# Patient Record
Sex: FEMALE | Race: OTHER | HISPANIC OR LATINO
[De-identification: names, ages, dates, MRNs, and addresses within clinical notes are randomized per-mention and may not be internally consistent; named-entity substitution may affect disease eponyms.]

---

## 2021-05-25 ENCOUNTER — APPOINTMENT (OUTPATIENT)
Dept: SURGERY | Facility: CLINIC | Age: 71
End: 2021-05-25
Payer: MEDICARE

## 2021-05-25 VITALS
BODY MASS INDEX: 33.49 KG/M2 | WEIGHT: 201 LBS | TEMPERATURE: 97 F | SYSTOLIC BLOOD PRESSURE: 128 MMHG | HEART RATE: 67 BPM | DIASTOLIC BLOOD PRESSURE: 81 MMHG | OXYGEN SATURATION: 96 % | HEIGHT: 65 IN

## 2021-05-25 PROCEDURE — 99204 OFFICE O/P NEW MOD 45 MIN: CPT

## 2021-05-25 NOTE — ADDENDUM
[FreeTextEntry1] : This note was written by Bree Oneil on 05/25/2021 acting as scribe for Dr. Mariscal

## 2021-05-25 NOTE — PHYSICAL EXAM
[Obese, well nourished, in no acute distress] : obese, well nourished, in no acute distress [Normal] : affect appropriate [de-identified] : normal respiration

## 2021-05-25 NOTE — END OF VISIT
[FreeTextEntry3] : All medical record entries made by the Scribe were at my, Dr. Mariscal's, discretion and personally dictated by me on 05/25/2021. I have reviewed the chart and agree that the record accurately reflects my personal performance of the history, physical exam, assessment and plan. I have also personally directed, reviewed and agreed to the chart.

## 2021-05-25 NOTE — HISTORY OF PRESENT ILLNESS
[de-identified] : Pt is a 69 y/o Niuean-speaking F who presents today for initial consult of hiatal hernia. She is doing generally well. She reports severe GERD symptoms, stating that she feels a lot of acid reflux in her throat to the point she sometimes feels that she's drowning. Reports symptoms are most severe at night. EGD 3/14/21 demonstrates medium-sized hiatal hernia, tortuous esophagus, erythematous mucosa in the stomach, chronic gastritis.

## 2021-05-25 NOTE — ASSESSMENT
[FreeTextEntry1] : Pt is a 71 y/o Sinhala-speaking F who presents today for initial consult of hiatal hernia and severe GERD symptoms. EGD 3/14/21 demonstrates medium-sized hiatal hernia, tortuous esophagus, erythematous mucosa in the stomach, chronic gastritis. I informed pt that I can perform a surgery to repair the hernia and alleviate GERD symptoms. I ordered UGI series for further investigation. The risks, benefits, and alternatives to the proposed procedure were discussed with the patient and all questions were answered to their satisfaction. Pt will do UGI series and will follow up after to discuss results. Will tentatively schedule patient for hiatal hernia repair.

## 2021-06-08 ENCOUNTER — OUTPATIENT (OUTPATIENT)
Dept: OUTPATIENT SERVICES | Facility: HOSPITAL | Age: 71
LOS: 1 days | End: 2021-06-08
Payer: COMMERCIAL

## 2021-06-08 ENCOUNTER — APPOINTMENT (OUTPATIENT)
Dept: SURGERY | Facility: CLINIC | Age: 71
End: 2021-06-08
Payer: MEDICARE

## 2021-06-08 VITALS
TEMPERATURE: 97.3 F | HEIGHT: 65 IN | WEIGHT: 201.38 LBS | BODY MASS INDEX: 33.55 KG/M2 | SYSTOLIC BLOOD PRESSURE: 144 MMHG | DIASTOLIC BLOOD PRESSURE: 81 MMHG | HEART RATE: 61 BPM | OXYGEN SATURATION: 95 %

## 2021-06-08 DIAGNOSIS — Z01.818 ENCOUNTER FOR OTHER PREPROCEDURAL EXAMINATION: ICD-10-CM

## 2021-06-08 LAB
A1C WITH ESTIMATED AVERAGE GLUCOSE RESULT: 6 % — HIGH (ref 4–5.6)
ALBUMIN SERPL ELPH-MCNC: 4.6 G/DL — SIGNIFICANT CHANGE UP (ref 3.3–5)
ALP SERPL-CCNC: 56 U/L — SIGNIFICANT CHANGE UP (ref 40–120)
ALT FLD-CCNC: 13 U/L — SIGNIFICANT CHANGE UP (ref 10–45)
ANION GAP SERPL CALC-SCNC: 14 MMOL/L — SIGNIFICANT CHANGE UP (ref 5–17)
APPEARANCE UR: CLEAR — SIGNIFICANT CHANGE UP
APTT BLD: 32.6 SEC — SIGNIFICANT CHANGE UP (ref 27.5–35.5)
AST SERPL-CCNC: 16 U/L — SIGNIFICANT CHANGE UP (ref 10–40)
BASOPHILS # BLD AUTO: 0.04 K/UL — SIGNIFICANT CHANGE UP (ref 0–0.2)
BASOPHILS NFR BLD AUTO: 0.5 % — SIGNIFICANT CHANGE UP (ref 0–2)
BILIRUB SERPL-MCNC: 0.3 MG/DL — SIGNIFICANT CHANGE UP (ref 0.2–1.2)
BILIRUB UR-MCNC: NEGATIVE — SIGNIFICANT CHANGE UP
BUN SERPL-MCNC: 21 MG/DL — SIGNIFICANT CHANGE UP (ref 7–23)
CALCIUM SERPL-MCNC: 9.7 MG/DL — SIGNIFICANT CHANGE UP (ref 8.4–10.5)
CHLORIDE SERPL-SCNC: 104 MMOL/L — SIGNIFICANT CHANGE UP (ref 96–108)
CO2 SERPL-SCNC: 26 MMOL/L — SIGNIFICANT CHANGE UP (ref 22–31)
COLOR SPEC: YELLOW — SIGNIFICANT CHANGE UP
CREAT SERPL-MCNC: 1.23 MG/DL — SIGNIFICANT CHANGE UP (ref 0.5–1.3)
DIFF PNL FLD: NEGATIVE — SIGNIFICANT CHANGE UP
EOSINOPHIL # BLD AUTO: 0.18 K/UL — SIGNIFICANT CHANGE UP (ref 0–0.5)
EOSINOPHIL NFR BLD AUTO: 2.3 % — SIGNIFICANT CHANGE UP (ref 0–6)
ESTIMATED AVERAGE GLUCOSE: 126 MG/DL — HIGH (ref 68–114)
GLUCOSE SERPL-MCNC: 99 MG/DL — SIGNIFICANT CHANGE UP (ref 70–99)
GLUCOSE UR QL: NEGATIVE — SIGNIFICANT CHANGE UP
HCG UR QL: NEGATIVE — SIGNIFICANT CHANGE UP
HCT VFR BLD CALC: 36.6 % — SIGNIFICANT CHANGE UP (ref 34.5–45)
HGB BLD-MCNC: 11.4 G/DL — LOW (ref 11.5–15.5)
IMM GRANULOCYTES NFR BLD AUTO: 0.3 % — SIGNIFICANT CHANGE UP (ref 0–1.5)
INR BLD: 0.97 — SIGNIFICANT CHANGE UP (ref 0.88–1.16)
KETONES UR-MCNC: NEGATIVE — SIGNIFICANT CHANGE UP
LEUKOCYTE ESTERASE UR-ACNC: NEGATIVE — SIGNIFICANT CHANGE UP
LYMPHOCYTES # BLD AUTO: 1.83 K/UL — SIGNIFICANT CHANGE UP (ref 1–3.3)
LYMPHOCYTES # BLD AUTO: 23.5 % — SIGNIFICANT CHANGE UP (ref 13–44)
MCHC RBC-ENTMCNC: 29.8 PG — SIGNIFICANT CHANGE UP (ref 27–34)
MCHC RBC-ENTMCNC: 31.1 GM/DL — LOW (ref 32–36)
MCV RBC AUTO: 95.8 FL — SIGNIFICANT CHANGE UP (ref 80–100)
MONOCYTES # BLD AUTO: 0.49 K/UL — SIGNIFICANT CHANGE UP (ref 0–0.9)
MONOCYTES NFR BLD AUTO: 6.3 % — SIGNIFICANT CHANGE UP (ref 2–14)
NEUTROPHILS # BLD AUTO: 5.23 K/UL — SIGNIFICANT CHANGE UP (ref 1.8–7.4)
NEUTROPHILS NFR BLD AUTO: 67.1 % — SIGNIFICANT CHANGE UP (ref 43–77)
NITRITE UR-MCNC: NEGATIVE — SIGNIFICANT CHANGE UP
NRBC # BLD: 0 /100 WBCS — SIGNIFICANT CHANGE UP (ref 0–0)
PH UR: 6.5 — SIGNIFICANT CHANGE UP (ref 5–8)
PLATELET # BLD AUTO: 238 K/UL — SIGNIFICANT CHANGE UP (ref 150–400)
POTASSIUM SERPL-MCNC: 4.6 MMOL/L — SIGNIFICANT CHANGE UP (ref 3.5–5.3)
POTASSIUM SERPL-SCNC: 4.6 MMOL/L — SIGNIFICANT CHANGE UP (ref 3.5–5.3)
PROT SERPL-MCNC: 7.1 G/DL — SIGNIFICANT CHANGE UP (ref 6–8.3)
PROT UR-MCNC: NEGATIVE MG/DL — SIGNIFICANT CHANGE UP
PROTHROM AB SERPL-ACNC: 11.7 SEC — SIGNIFICANT CHANGE UP (ref 10.6–13.6)
RBC # BLD: 3.82 M/UL — SIGNIFICANT CHANGE UP (ref 3.8–5.2)
RBC # FLD: 13.2 % — SIGNIFICANT CHANGE UP (ref 10.3–14.5)
SODIUM SERPL-SCNC: 144 MMOL/L — SIGNIFICANT CHANGE UP (ref 135–145)
SP GR SPEC: 1.01 — SIGNIFICANT CHANGE UP (ref 1–1.03)
UROBILINOGEN FLD QL: 0.2 E.U./DL — SIGNIFICANT CHANGE UP
WBC # BLD: 7.79 K/UL — SIGNIFICANT CHANGE UP (ref 3.8–10.5)
WBC # FLD AUTO: 7.79 K/UL — SIGNIFICANT CHANGE UP (ref 3.8–10.5)

## 2021-06-08 PROCEDURE — 83036 HEMOGLOBIN GLYCOSYLATED A1C: CPT

## 2021-06-08 PROCEDURE — 87086 URINE CULTURE/COLONY COUNT: CPT

## 2021-06-08 PROCEDURE — 93010 ELECTROCARDIOGRAM REPORT: CPT

## 2021-06-08 PROCEDURE — 93005 ELECTROCARDIOGRAM TRACING: CPT

## 2021-06-08 PROCEDURE — 85610 PROTHROMBIN TIME: CPT

## 2021-06-08 PROCEDURE — 85025 COMPLETE CBC W/AUTO DIFF WBC: CPT

## 2021-06-08 PROCEDURE — 81025 URINE PREGNANCY TEST: CPT

## 2021-06-08 PROCEDURE — 80053 COMPREHEN METABOLIC PANEL: CPT

## 2021-06-08 PROCEDURE — 81003 URINALYSIS AUTO W/O SCOPE: CPT

## 2021-06-08 PROCEDURE — 85730 THROMBOPLASTIN TIME PARTIAL: CPT

## 2021-06-08 PROCEDURE — 99212 OFFICE O/P EST SF 10 MIN: CPT

## 2021-06-08 NOTE — PHYSICAL EXAM
[Obese, well nourished, in no acute distress] : obese, well nourished, in no acute distress [Normal] : affect appropriate [Obese] : obese [de-identified] : normal respiration

## 2021-06-08 NOTE — ADDENDUM
[FreeTextEntry1] : This note was written by Bree Oneil on 06/08/2021 acting as scribe for NOE Rodríguez.

## 2021-06-08 NOTE — END OF VISIT
[FreeTextEntry3] : All medical record entries made by the Dilciaibe were at my, NOE Rodríguez, discretion and personally dictated by me on 06/08/2021 . I have reviewed the chart and agree that the record accurately reflects my personal performance of the history, physical exam, assessment and plan. I have also personally directed, reviewed and agreed to the chart.

## 2021-06-08 NOTE — ASSESSMENT
[FreeTextEntry1] : Pt is a 71 y/o Bulgarian-speaking F who presents today for follow up consult of symptomatic hiatal hernia. Hernia repair has been scheduled but waiting on UGI series results.

## 2021-06-08 NOTE — HISTORY OF PRESENT ILLNESS
[de-identified] : Pt is a 71 y/o South Korean-speaking F who presents today for follow up consult of symptomatic hiatal hernia. She has been scheduled for a hiatal hernia repair but we are waiting on results from her UGI series as she did not bring with her/not in system. Pt unsure of where she went for this test and will work with Kimberley to assist in locating. \par \par \par UGI xray obtained from [facility]. Possible compression of esophagus from thoracic aorta, to be further evaluated by cardio

## 2021-06-08 NOTE — PLAN
[FreeTextEntry1] : Kimberley to assist pt in locating UGI, will schedule TEB ater obtained to review\par Surgery for hiatal hernia 7/22

## 2021-06-09 LAB
CULTURE RESULTS: SIGNIFICANT CHANGE UP
SPECIMEN SOURCE: SIGNIFICANT CHANGE UP

## 2021-06-22 ENCOUNTER — APPOINTMENT (OUTPATIENT)
Dept: ULTRASOUND IMAGING | Facility: HOSPITAL | Age: 71
End: 2021-06-22
Payer: MEDICARE

## 2021-06-22 ENCOUNTER — OUTPATIENT (OUTPATIENT)
Dept: OUTPATIENT SERVICES | Facility: HOSPITAL | Age: 71
LOS: 1 days | End: 2021-06-22
Payer: MEDICARE

## 2021-06-22 ENCOUNTER — RESULT REVIEW (OUTPATIENT)
Age: 71
End: 2021-06-22

## 2021-06-22 ENCOUNTER — APPOINTMENT (OUTPATIENT)
Dept: RADIOLOGY | Facility: HOSPITAL | Age: 71
End: 2021-06-22

## 2021-06-22 PROCEDURE — 76700 US EXAM ABDOM COMPLETE: CPT

## 2021-06-22 PROCEDURE — 74240 X-RAY XM UPR GI TRC 1CNTRST: CPT | Mod: 26

## 2021-06-22 PROCEDURE — 76700 US EXAM ABDOM COMPLETE: CPT | Mod: 26

## 2021-06-22 PROCEDURE — 71046 X-RAY EXAM CHEST 2 VIEWS: CPT

## 2021-06-22 PROCEDURE — 71046 X-RAY EXAM CHEST 2 VIEWS: CPT | Mod: 26

## 2021-06-22 PROCEDURE — 74240 X-RAY XM UPR GI TRC 1CNTRST: CPT

## 2021-07-19 ENCOUNTER — LABORATORY RESULT (OUTPATIENT)
Age: 71
End: 2021-07-19

## 2021-07-21 VITALS
SYSTOLIC BLOOD PRESSURE: 126 MMHG | HEIGHT: 65 IN | WEIGHT: 197.53 LBS | TEMPERATURE: 98 F | OXYGEN SATURATION: 98 % | DIASTOLIC BLOOD PRESSURE: 79 MMHG | HEART RATE: 59 BPM | RESPIRATION RATE: 18 BRPM

## 2021-07-21 NOTE — ASU PATIENT PROFILE, ADULT - PATIENT REPRESENTATIVE NAME
Jorge 45 Transitions Initial Follow Up Call    Outreach made within 2 business days of discharge: Yes    Patient: López Turpin Patient : 1982   MRN: <[id number]>  Reason for Admission: There are no discharge diagnoses documented for the most recent discharge. Discharge Date: 21       Spoke with: Gilbert Márquez    Discharge department/facility: [place]     TCM Interactive Patient Contact:  Was patient able to fill all prescriptions: Yes  Was patient instructed to bring all medications to the follow-up visit: Yes  Is patient taking all medications as directed in the discharge summary?  Yes  Does patient understand their discharge instructions: Yes  Does patient have questions or concerns that need addressed prior to 7-14 day follow up office visit: no    Scheduled appointment with PCP within 7-14 days    Follow Up  Future Appointments   Date Time Provider Jonah Reaves   2021 11:00 AM Justina Bullock, APRN - CNP [facility]       Geam Ramirez Majo Cook Daughter

## 2021-07-21 NOTE — ASU PATIENT PROFILE, ADULT - PSH
H/O bilateral breast reduction surgery    H/O:   x3  History of surgery  abdominoplasty   H/O bilateral breast reduction surgery    H/O:   x3  History of right knee surgery  TKR 2019  History of surgery  abdominoplasty

## 2021-07-22 ENCOUNTER — INPATIENT (INPATIENT)
Facility: HOSPITAL | Age: 71
LOS: 1 days | Discharge: HOME | DRG: 328 | End: 2021-07-24
Attending: SURGERY | Admitting: SURGERY
Payer: MEDICARE

## 2021-07-22 ENCOUNTER — APPOINTMENT (OUTPATIENT)
Dept: SURGERY | Facility: HOSPITAL | Age: 71
End: 2021-07-22

## 2021-07-22 DIAGNOSIS — E78.5 HYPERLIPIDEMIA, UNSPECIFIED: ICD-10-CM

## 2021-07-22 DIAGNOSIS — Z98.890 OTHER SPECIFIED POSTPROCEDURAL STATES: Chronic | ICD-10-CM

## 2021-07-22 DIAGNOSIS — K21.9 GASTRO-ESOPHAGEAL REFLUX DISEASE WITHOUT ESOPHAGITIS: ICD-10-CM

## 2021-07-22 DIAGNOSIS — K44.9 DIAPHRAGMATIC HERNIA WITHOUT OBSTRUCTION OR GANGRENE: ICD-10-CM

## 2021-07-22 DIAGNOSIS — I10 ESSENTIAL (PRIMARY) HYPERTENSION: ICD-10-CM

## 2021-07-22 DIAGNOSIS — Z98.891 HISTORY OF UTERINE SCAR FROM PREVIOUS SURGERY: Chronic | ICD-10-CM

## 2021-07-22 LAB
BASE EXCESS BLDA CALC-SCNC: 0.5 MMOL/L — SIGNIFICANT CHANGE UP (ref -2–3)
BLD GP AB SCN SERPL QL: NEGATIVE — SIGNIFICANT CHANGE UP
CA-I BLDA-SCNC: 1.22 MMOL/L — SIGNIFICANT CHANGE UP (ref 1.15–1.33)
CO2 BLDA-SCNC: 27 MMOL/L — HIGH (ref 19–24)
COHGB MFR BLDA: 0.5 % — SIGNIFICANT CHANGE UP
GAS PNL BLDA: SIGNIFICANT CHANGE UP
GLUCOSE BLDC GLUCOMTR-MCNC: 154 MG/DL — HIGH (ref 70–99)
HCO3 BLDA-SCNC: 25 MMOL/L — SIGNIFICANT CHANGE UP (ref 21–28)
HCT VFR BLD CALC: 37.2 % — SIGNIFICANT CHANGE UP (ref 34.5–45)
HGB BLD-MCNC: 12.1 G/DL — SIGNIFICANT CHANGE UP (ref 11.5–15.5)
HGB BLDA-MCNC: 10.9 G/DL — LOW (ref 11.7–16.1)
MCHC RBC-ENTMCNC: 30.3 PG — SIGNIFICANT CHANGE UP (ref 27–34)
MCHC RBC-ENTMCNC: 32.5 GM/DL — SIGNIFICANT CHANGE UP (ref 32–36)
MCV RBC AUTO: 93 FL — SIGNIFICANT CHANGE UP (ref 80–100)
METHGB MFR BLDA: 1 % — SIGNIFICANT CHANGE UP
NRBC # BLD: 0 /100 WBCS — SIGNIFICANT CHANGE UP (ref 0–0)
OXYHGB MFR BLDA: 96.8 % — HIGH (ref 90–95)
PCO2 BLDA: 41 MMHG — HIGH (ref 32–35)
PH BLDA: 7.4 — SIGNIFICANT CHANGE UP (ref 7.35–7.45)
PLATELET # BLD AUTO: 245 K/UL — SIGNIFICANT CHANGE UP (ref 150–400)
PO2 BLDA: 199 MMHG — HIGH (ref 83–108)
POTASSIUM BLDA-SCNC: 3.6 MMOL/L — SIGNIFICANT CHANGE UP (ref 3.5–5.1)
RBC # BLD: 4 M/UL — SIGNIFICANT CHANGE UP (ref 3.8–5.2)
RBC # FLD: 13.2 % — SIGNIFICANT CHANGE UP (ref 10.3–14.5)
RH IG SCN BLD-IMP: POSITIVE — SIGNIFICANT CHANGE UP
SAO2 % BLDA: 98.3 % — HIGH (ref 94–98)
SODIUM BLDA-SCNC: 139 MMOL/L — SIGNIFICANT CHANGE UP (ref 136–145)
WBC # BLD: 10.18 K/UL — SIGNIFICANT CHANGE UP (ref 3.8–10.5)
WBC # FLD AUTO: 10.18 K/UL — SIGNIFICANT CHANGE UP (ref 3.8–10.5)

## 2021-07-22 PROCEDURE — S2900 ROBOTIC SURGICAL SYSTEM: CPT | Mod: NC

## 2021-07-22 PROCEDURE — 43280 LAPAROSCOPY FUNDOPLASTY: CPT | Mod: GC

## 2021-07-22 RX ORDER — ACETAMINOPHEN 500 MG
1000 TABLET ORAL ONCE
Refills: 0 | Status: COMPLETED | OUTPATIENT
Start: 2021-07-22 | End: 2021-07-22

## 2021-07-22 RX ORDER — PANTOPRAZOLE SODIUM 20 MG/1
40 TABLET, DELAYED RELEASE ORAL DAILY
Refills: 0 | Status: DISCONTINUED | OUTPATIENT
Start: 2021-07-22 | End: 2021-07-24

## 2021-07-22 RX ORDER — ACETAMINOPHEN 500 MG
1000 TABLET ORAL ONCE
Refills: 0 | Status: COMPLETED | OUTPATIENT
Start: 2021-07-22 | End: 2021-07-23

## 2021-07-22 RX ORDER — ONDANSETRON 8 MG/1
4 TABLET, FILM COATED ORAL EVERY 4 HOURS
Refills: 0 | Status: DISCONTINUED | OUTPATIENT
Start: 2021-07-22 | End: 2021-07-24

## 2021-07-22 RX ORDER — ACETAMINOPHEN 500 MG
1000 TABLET ORAL ONCE
Refills: 0 | Status: COMPLETED | OUTPATIENT
Start: 2021-07-23 | End: 2021-07-23

## 2021-07-22 RX ORDER — SCOPALAMINE 1 MG/3D
1 PATCH, EXTENDED RELEASE TRANSDERMAL ONCE
Refills: 0 | Status: COMPLETED | OUTPATIENT
Start: 2021-07-22 | End: 2021-07-22

## 2021-07-22 RX ORDER — SCOPALAMINE 1 MG/3D
1 PATCH, EXTENDED RELEASE TRANSDERMAL ONCE
Refills: 0 | Status: DISCONTINUED | OUTPATIENT
Start: 2021-07-22 | End: 2021-07-24

## 2021-07-22 RX ORDER — SODIUM CHLORIDE 9 MG/ML
1000 INJECTION, SOLUTION INTRAVENOUS
Refills: 0 | Status: DISCONTINUED | OUTPATIENT
Start: 2021-07-22 | End: 2021-07-24

## 2021-07-22 RX ORDER — HYDROMORPHONE HYDROCHLORIDE 2 MG/ML
0.25 INJECTION INTRAMUSCULAR; INTRAVENOUS; SUBCUTANEOUS ONCE
Refills: 0 | Status: DISCONTINUED | OUTPATIENT
Start: 2021-07-22 | End: 2021-07-22

## 2021-07-22 RX ORDER — BUPIVACAINE 13.3 MG/ML
20 INJECTION, SUSPENSION, LIPOSOMAL INFILTRATION ONCE
Refills: 0 | Status: DISCONTINUED | OUTPATIENT
Start: 2021-07-22 | End: 2021-07-22

## 2021-07-22 RX ORDER — ENOXAPARIN SODIUM 100 MG/ML
40 INJECTION SUBCUTANEOUS ONCE
Refills: 0 | Status: COMPLETED | OUTPATIENT
Start: 2021-07-22 | End: 2021-07-22

## 2021-07-22 RX ORDER — ACETAMINOPHEN 500 MG
650 TABLET ORAL EVERY 6 HOURS
Refills: 0 | Status: DISCONTINUED | OUTPATIENT
Start: 2021-07-23 | End: 2021-07-24

## 2021-07-22 RX ORDER — KETOROLAC TROMETHAMINE 30 MG/ML
15 SYRINGE (ML) INJECTION EVERY 6 HOURS
Refills: 0 | Status: DISCONTINUED | OUTPATIENT
Start: 2021-07-22 | End: 2021-07-24

## 2021-07-22 RX ORDER — GABAPENTIN 400 MG/1
300 CAPSULE ORAL ONCE
Refills: 0 | Status: COMPLETED | OUTPATIENT
Start: 2021-07-22 | End: 2021-07-22

## 2021-07-22 RX ADMIN — Medication 15 MILLIGRAM(S): at 23:11

## 2021-07-22 RX ADMIN — HYDROMORPHONE HYDROCHLORIDE 0.25 MILLIGRAM(S): 2 INJECTION INTRAMUSCULAR; INTRAVENOUS; SUBCUTANEOUS at 17:53

## 2021-07-22 RX ADMIN — ENOXAPARIN SODIUM 40 MILLIGRAM(S): 100 INJECTION SUBCUTANEOUS at 09:32

## 2021-07-22 RX ADMIN — SCOPALAMINE 1 PATCH: 1 PATCH, EXTENDED RELEASE TRANSDERMAL at 09:33

## 2021-07-22 RX ADMIN — Medication 1000 MILLIGRAM(S): at 09:31

## 2021-07-22 RX ADMIN — GABAPENTIN 300 MILLIGRAM(S): 400 CAPSULE ORAL at 09:33

## 2021-07-22 RX ADMIN — Medication 15 MILLIGRAM(S): at 23:26

## 2021-07-22 RX ADMIN — HYDROMORPHONE HYDROCHLORIDE 0.25 MILLIGRAM(S): 2 INJECTION INTRAMUSCULAR; INTRAVENOUS; SUBCUTANEOUS at 18:40

## 2021-07-22 RX ADMIN — Medication 400 MILLIGRAM(S): at 15:05

## 2021-07-22 RX ADMIN — PANTOPRAZOLE SODIUM 40 MILLIGRAM(S): 20 TABLET, DELAYED RELEASE ORAL at 15:06

## 2021-07-22 RX ADMIN — Medication 1000 MILLIGRAM(S): at 15:35

## 2021-07-22 NOTE — BRIEF OPERATIVE NOTE - NSICDXBRIEFPROCEDURE_GEN_ALL_CORE_FT
PROCEDURES:  Robot-assisted laparoscopic Nissen fundoplication for hiatal hernia 22-Jul-2021 14:22:03  Shawanda De Dios

## 2021-07-22 NOTE — BRIEF OPERATIVE NOTE - OPERATION/FINDINGS
Abimael needle insufflation, Abdomen inspected. Robot docked, and targeted. Contents of hernia sac reduced into abdomen, Vagus identified and protected, esophagus mobilized distally, closure of the crura with Quill suture x 1, creation of fundoplication and securing with polyester suture x 2.  The abdomen was inspected. Hemostasis achieved. Robot undocked. skin closed with Monocryl.    The abdomen was inspected. Hemostasis achieved. Robot undocked. skin closed with Monocryl.

## 2021-07-22 NOTE — H&P ADULT - ASSESSMENT
70 year old female with PMH of GERD non-responsive to medications HTN, HLD, and PMH of Tummy tuck procedure, 3 c- section, and bilateral breast reduction surgery, who presents for elective robotic-assisted Hiatal hernia repair    plan:   proceed with robotic-assisted Hiatal hernia repair

## 2021-07-22 NOTE — H&P ADULT - HISTORY OF PRESENT ILLNESS
70 year old female with PMH of GERD non-responsive to medications HTN, HLD,  and PMH of Tummy tuck procedure, 3 c- section, and bilateral breast reduction surgery, who presents for elective robotic -assisted Hiatal hernia repair. Doing well,. Patient reports history of GERD for > 8 months. has been taking PPI without improvement Recently had EGD 3 months ago with Dr. Wooten and found to have hiatal hernia and gastritis; biopsies were taken and were negative. She dnies dysphagia to solids or liquids, no fevers, chills, SOB, chest pain, N/V/D/C.     Pre-op Hg/Hct: 11.4/36.6   70 year old female with PMH of GERD non-responsive to medications HTN, HLD, and PSH of Tummy tuck procedure (abdominoplasty), 3 c- section, and bilateral breast reduction surgery, who presents for elective robotic -assisted Hiatal hernia repair. Doing well,. Patient reports history of GERD for > 8 months. has been taking PPI without improvement Recently had EGD 3 months ago with Dr. Wooten and found to have hiatal hernia and gastritis; biopsies were taken and were negative. She dnies dysphagia to solids or liquids, no fevers, chills, SOB, chest pain, N/V/D/C.     Pre-op Hg/Hct: 11.4/36.6

## 2021-07-22 NOTE — H&P ADULT - NSHPPHYSICALEXAM_GEN_ALL_CORE
GENERAL: NAD, Resting comfortably in bed  HEENT: NCAT, MMM, Normal conjunctiva, PERRL  RESP: Nonlabored breathing, No respiratory distress  CARD: Normal rate, Normal peripheral perfusion  GI: Soft, ND, NT, No guarding, No rebound tenderness, previous surgery scars   EXTREM: No gross deformity of extremities  SKIN: No rashes, no lesions  NEURO: AAOx3, No focal motor or sensory deficits  PSYCH: Affect and characteristics of appearance, verbalizations, and behaviors are appropriate

## 2021-07-22 NOTE — BRIEF OPERATIVE NOTE - NSICDXBRIEFPOSTOP_GEN_ALL_CORE_FT
POST-OP DIAGNOSIS:  Hiatal hernia 22-Jul-2021 14:23:30  Shawanda De Dios  GERD (gastroesophageal reflux disease) 22-Jul-2021 14:23:47  Shawanda De Dios

## 2021-07-22 NOTE — H&P ADULT - NSICDXPASTSURGICALHX_GEN_ALL_CORE_FT
PAST SURGICAL HISTORY:  H/O bilateral breast reduction surgery     H/O:  x3    History of right knee surgery TKR 2019    History of surgery abdominoplasty

## 2021-07-23 ENCOUNTER — TRANSCRIPTION ENCOUNTER (OUTPATIENT)
Age: 71
End: 2021-07-23

## 2021-07-23 LAB
ANION GAP SERPL CALC-SCNC: 9 MMOL/L — SIGNIFICANT CHANGE UP (ref 5–17)
BUN SERPL-MCNC: 17 MG/DL — SIGNIFICANT CHANGE UP (ref 7–23)
CALCIUM SERPL-MCNC: 9 MG/DL — SIGNIFICANT CHANGE UP (ref 8.4–10.5)
CHLORIDE SERPL-SCNC: 104 MMOL/L — SIGNIFICANT CHANGE UP (ref 96–108)
CO2 SERPL-SCNC: 26 MMOL/L — SIGNIFICANT CHANGE UP (ref 22–31)
COVID-19 SPIKE DOMAIN AB INTERP: POSITIVE
COVID-19 SPIKE DOMAIN ANTIBODY RESULT: >250 U/ML — HIGH
CREAT SERPL-MCNC: 1.22 MG/DL — SIGNIFICANT CHANGE UP (ref 0.5–1.3)
GLUCOSE SERPL-MCNC: 113 MG/DL — HIGH (ref 70–99)
HCT VFR BLD CALC: 33.6 % — LOW (ref 34.5–45)
HCV AB S/CO SERPL IA: 0.04 S/CO — SIGNIFICANT CHANGE UP
HCV AB SERPL-IMP: SIGNIFICANT CHANGE UP
HGB BLD-MCNC: 10.5 G/DL — LOW (ref 11.5–15.5)
MAGNESIUM SERPL-MCNC: 2 MG/DL — SIGNIFICANT CHANGE UP (ref 1.6–2.6)
MCHC RBC-ENTMCNC: 29.5 PG — SIGNIFICANT CHANGE UP (ref 27–34)
MCHC RBC-ENTMCNC: 31.3 GM/DL — LOW (ref 32–36)
MCV RBC AUTO: 94.4 FL — SIGNIFICANT CHANGE UP (ref 80–100)
NRBC # BLD: 0 /100 WBCS — SIGNIFICANT CHANGE UP (ref 0–0)
PHOSPHATE SERPL-MCNC: 3.6 MG/DL — SIGNIFICANT CHANGE UP (ref 2.5–4.5)
PLATELET # BLD AUTO: 211 K/UL — SIGNIFICANT CHANGE UP (ref 150–400)
POTASSIUM SERPL-MCNC: 4.4 MMOL/L — SIGNIFICANT CHANGE UP (ref 3.5–5.3)
POTASSIUM SERPL-SCNC: 4.4 MMOL/L — SIGNIFICANT CHANGE UP (ref 3.5–5.3)
RBC # BLD: 3.56 M/UL — LOW (ref 3.8–5.2)
RBC # FLD: 13.4 % — SIGNIFICANT CHANGE UP (ref 10.3–14.5)
SARS-COV-2 IGG+IGM SERPL QL IA: >250 U/ML — HIGH
SARS-COV-2 IGG+IGM SERPL QL IA: POSITIVE
SODIUM SERPL-SCNC: 139 MMOL/L — SIGNIFICANT CHANGE UP (ref 135–145)
WBC # BLD: 9.78 K/UL — SIGNIFICANT CHANGE UP (ref 3.8–10.5)
WBC # FLD AUTO: 9.78 K/UL — SIGNIFICANT CHANGE UP (ref 3.8–10.5)

## 2021-07-23 PROCEDURE — 74240 X-RAY XM UPR GI TRC 1CNTRST: CPT | Mod: 26

## 2021-07-23 RX ORDER — ENOXAPARIN SODIUM 100 MG/ML
40 INJECTION SUBCUTANEOUS EVERY 24 HOURS
Refills: 0 | Status: DISCONTINUED | OUTPATIENT
Start: 2021-07-23 | End: 2021-07-24

## 2021-07-23 RX ORDER — ENOXAPARIN SODIUM 100 MG/ML
40 INJECTION SUBCUTANEOUS ONCE
Refills: 0 | Status: DISCONTINUED | OUTPATIENT
Start: 2021-07-23 | End: 2021-07-23

## 2021-07-23 RX ORDER — METOPROLOL TARTRATE 50 MG
50 TABLET ORAL DAILY
Refills: 0 | Status: DISCONTINUED | OUTPATIENT
Start: 2021-07-23 | End: 2021-07-23

## 2021-07-23 RX ORDER — PANTOPRAZOLE SODIUM 20 MG/1
1 TABLET, DELAYED RELEASE ORAL
Qty: 30 | Refills: 0
Start: 2021-07-23 | End: 2021-08-21

## 2021-07-23 RX ORDER — DEXAMETHASONE 0.5 MG/5ML
6 ELIXIR ORAL ONCE
Refills: 0 | Status: COMPLETED | OUTPATIENT
Start: 2021-07-23 | End: 2021-07-23

## 2021-07-23 RX ORDER — METOPROLOL TARTRATE 50 MG
50 TABLET ORAL
Refills: 0 | Status: DISCONTINUED | OUTPATIENT
Start: 2021-07-23 | End: 2021-07-24

## 2021-07-23 RX ORDER — ATORVASTATIN CALCIUM 80 MG/1
20 TABLET, FILM COATED ORAL AT BEDTIME
Refills: 0 | Status: DISCONTINUED | OUTPATIENT
Start: 2021-07-23 | End: 2021-07-24

## 2021-07-23 RX ORDER — ACETAMINOPHEN 500 MG
2 TABLET ORAL
Qty: 40 | Refills: 0
Start: 2021-07-23 | End: 2021-07-27

## 2021-07-23 RX ADMIN — SCOPALAMINE 1 PATCH: 1 PATCH, EXTENDED RELEASE TRANSDERMAL at 18:40

## 2021-07-23 RX ADMIN — Medication 400 MILLIGRAM(S): at 21:51

## 2021-07-23 RX ADMIN — ENOXAPARIN SODIUM 40 MILLIGRAM(S): 100 INJECTION SUBCUTANEOUS at 12:53

## 2021-07-23 RX ADMIN — Medication 15 MILLIGRAM(S): at 12:54

## 2021-07-23 RX ADMIN — Medication 1000 MILLIGRAM(S): at 05:09

## 2021-07-23 RX ADMIN — Medication 50 MILLIGRAM(S): at 22:32

## 2021-07-23 RX ADMIN — ATORVASTATIN CALCIUM 20 MILLIGRAM(S): 80 TABLET, FILM COATED ORAL at 22:32

## 2021-07-23 RX ADMIN — Medication 15 MILLIGRAM(S): at 04:50

## 2021-07-23 RX ADMIN — Medication 15 MILLIGRAM(S): at 13:24

## 2021-07-23 RX ADMIN — Medication 15 MILLIGRAM(S): at 18:36

## 2021-07-23 RX ADMIN — SCOPALAMINE 1 PATCH: 1 PATCH, EXTENDED RELEASE TRANSDERMAL at 07:14

## 2021-07-23 RX ADMIN — Medication 15 MILLIGRAM(S): at 04:35

## 2021-07-23 RX ADMIN — Medication 1000 MILLIGRAM(S): at 22:21

## 2021-07-23 RX ADMIN — PANTOPRAZOLE SODIUM 40 MILLIGRAM(S): 20 TABLET, DELAYED RELEASE ORAL at 12:54

## 2021-07-23 RX ADMIN — Medication 6 MILLIGRAM(S): at 16:12

## 2021-07-23 RX ADMIN — Medication 15 MILLIGRAM(S): at 19:30

## 2021-07-23 RX ADMIN — Medication 400 MILLIGRAM(S): at 04:39

## 2021-07-23 NOTE — DISCHARGE NOTE PROVIDER - NSDCCPTREATMENT_GEN_ALL_CORE_FT
PRINCIPAL PROCEDURE  Procedure: Robot-assisted laparoscopic Nissen fundoplication for hiatal hernia  Findings and Treatment:

## 2021-07-23 NOTE — DISCHARGE NOTE PROVIDER - HOSPITAL COURSE
70 year old female with PMH of GERD non-responsive to medications HTN, HLD, and PSH of Tummy tuck procedure (abdominoplasty), 3 c- section, and bilateral breast reduction surgery, now s/p Robot-assisted laparoscopic Nissen fundoplication for hiatal hernia 7/22. Patient's post operative course was uncomplicated. Patient met post-operative milestones. Pain was well controlled. Patient was tolerating diet without nausea or vomiting. Patient was ambulating without difficulties. Patient was stable and deemed appropriate for discharge. She will follow-up in clinic.

## 2021-07-23 NOTE — DISCHARGE NOTE PROVIDER - NSDCFUADDINST_GEN_ALL_CORE_FT
Follow up with Dr. Mariscal in 1 week. Call the office at  to schedule your appointment.  You may shower; soap and water over incision sites. Do not scrub. Pat dry when done. No tub bathing or swimming until cleared. Keep incision sites out of the sun as scars will darken. No heavy lifting (>10lbs) or strenuous exercise. Diet: Bariatric Full Fluids. 60 grams protein daily.  64 fluid ounces water daily. Drink small sips throughout the day. Continue diet as outlined by paperwork received as a pre-operative patient. You should be urinating at least 3-4x per day. Call the office if you experience increasing abdominal pain, nausea, vomiting, or temperature >100.4F.  NO ASPIRIN OR NSAIDs until approved by Dr. Mariscal. Avoid alcoholic beverages until cleared by Dr. Mariscal.    1) Please take Tylenol 650 mg every 4 to 6 hours by mouth for moderate pain control. Please do not exceed over 4,000 mg of Tylenol a day.  2) Please take Pantoprazole 40 mg once a day by mouth.

## 2021-07-23 NOTE — DISCHARGE NOTE PROVIDER - CARE PROVIDER_API CALL
Uriah Mariscal)  Surgery  [address] [address]  Phone: [phone]  Fax: [phone]  Follow Up Time:

## 2021-07-23 NOTE — DISCHARGE NOTE PROVIDER - NSDCCPCAREPLAN_GEN_ALL_CORE_FT
PRINCIPAL DISCHARGE DIAGNOSIS  Diagnosis: Chronic GERD  Assessment and Plan of Treatment: unresponsive to medications and conservative treatment. Patient now s/p Nissen funcoplication with hiatal hernia repair.   1) Please take Tylenol 650 mg every 4 to 6 hours by mouth for moderate pain control. Please do not exceed over 4,000 mg of Tylenol a day.  2) Please take Pantoprazole 40 mg once a day by mouth.

## 2021-07-23 NOTE — DISCHARGE NOTE PROVIDER - NSDCMRMEDTOKEN_GEN_ALL_CORE_FT
Protonix 40 mg oral delayed release tablet: 1 tab(s) orally once a day   Tylenol 325 mg oral tablet: 2 tab(s) orally every 6 hours MDD:8 tablets

## 2021-07-24 ENCOUNTER — TRANSCRIPTION ENCOUNTER (OUTPATIENT)
Age: 71
End: 2021-07-24

## 2021-07-24 VITALS
RESPIRATION RATE: 20 BRPM | TEMPERATURE: 99 F | DIASTOLIC BLOOD PRESSURE: 72 MMHG | HEART RATE: 63 BPM | OXYGEN SATURATION: 95 % | SYSTOLIC BLOOD PRESSURE: 152 MMHG

## 2021-07-24 LAB
ANION GAP SERPL CALC-SCNC: 9 MMOL/L — SIGNIFICANT CHANGE UP (ref 5–17)
BUN SERPL-MCNC: 15 MG/DL — SIGNIFICANT CHANGE UP (ref 7–23)
CALCIUM SERPL-MCNC: 9.2 MG/DL — SIGNIFICANT CHANGE UP (ref 8.4–10.5)
CHLORIDE SERPL-SCNC: 108 MMOL/L — SIGNIFICANT CHANGE UP (ref 96–108)
CO2 SERPL-SCNC: 26 MMOL/L — SIGNIFICANT CHANGE UP (ref 22–31)
CREAT SERPL-MCNC: 1.08 MG/DL — SIGNIFICANT CHANGE UP (ref 0.5–1.3)
GLUCOSE SERPL-MCNC: 133 MG/DL — HIGH (ref 70–99)
HCT VFR BLD CALC: 32.2 % — LOW (ref 34.5–45)
HGB BLD-MCNC: 10.2 G/DL — LOW (ref 11.5–15.5)
MAGNESIUM SERPL-MCNC: 2.1 MG/DL — SIGNIFICANT CHANGE UP (ref 1.6–2.6)
MCHC RBC-ENTMCNC: 29.7 PG — SIGNIFICANT CHANGE UP (ref 27–34)
MCHC RBC-ENTMCNC: 31.7 GM/DL — LOW (ref 32–36)
MCV RBC AUTO: 93.9 FL — SIGNIFICANT CHANGE UP (ref 80–100)
NRBC # BLD: 0 /100 WBCS — SIGNIFICANT CHANGE UP (ref 0–0)
PHOSPHATE SERPL-MCNC: 3.7 MG/DL — SIGNIFICANT CHANGE UP (ref 2.5–4.5)
PLATELET # BLD AUTO: 212 K/UL — SIGNIFICANT CHANGE UP (ref 150–400)
POTASSIUM SERPL-MCNC: 4.3 MMOL/L — SIGNIFICANT CHANGE UP (ref 3.5–5.3)
POTASSIUM SERPL-SCNC: 4.3 MMOL/L — SIGNIFICANT CHANGE UP (ref 3.5–5.3)
RBC # BLD: 3.43 M/UL — LOW (ref 3.8–5.2)
RBC # FLD: 13.7 % — SIGNIFICANT CHANGE UP (ref 10.3–14.5)
SODIUM SERPL-SCNC: 143 MMOL/L — SIGNIFICANT CHANGE UP (ref 135–145)
WBC # BLD: 7.81 K/UL — SIGNIFICANT CHANGE UP (ref 3.8–10.5)
WBC # FLD AUTO: 7.81 K/UL — SIGNIFICANT CHANGE UP (ref 3.8–10.5)

## 2021-07-24 PROCEDURE — 86900 BLOOD TYPING SEROLOGIC ABO: CPT

## 2021-07-24 PROCEDURE — 85027 COMPLETE CBC AUTOMATED: CPT

## 2021-07-24 PROCEDURE — S2900: CPT

## 2021-07-24 PROCEDURE — 36415 COLL VENOUS BLD VENIPUNCTURE: CPT

## 2021-07-24 PROCEDURE — 86901 BLOOD TYPING SEROLOGIC RH(D): CPT

## 2021-07-24 PROCEDURE — 86803 HEPATITIS C AB TEST: CPT

## 2021-07-24 PROCEDURE — 84100 ASSAY OF PHOSPHORUS: CPT

## 2021-07-24 PROCEDURE — 74240 X-RAY XM UPR GI TRC 1CNTRST: CPT

## 2021-07-24 PROCEDURE — 86769 SARS-COV-2 COVID-19 ANTIBODY: CPT

## 2021-07-24 PROCEDURE — 85018 HEMOGLOBIN: CPT

## 2021-07-24 PROCEDURE — 82330 ASSAY OF CALCIUM: CPT

## 2021-07-24 PROCEDURE — 84132 ASSAY OF SERUM POTASSIUM: CPT

## 2021-07-24 PROCEDURE — 84295 ASSAY OF SERUM SODIUM: CPT

## 2021-07-24 PROCEDURE — 86850 RBC ANTIBODY SCREEN: CPT

## 2021-07-24 PROCEDURE — 83735 ASSAY OF MAGNESIUM: CPT

## 2021-07-24 PROCEDURE — 80048 BASIC METABOLIC PNL TOTAL CA: CPT

## 2021-07-24 PROCEDURE — 82962 GLUCOSE BLOOD TEST: CPT

## 2021-07-24 RX ORDER — PANTOPRAZOLE SODIUM 20 MG/1
1 TABLET, DELAYED RELEASE ORAL
Qty: 30 | Refills: 0
Start: 2021-07-24 | End: 2021-08-22

## 2021-07-24 RX ORDER — ACETAMINOPHEN 500 MG
2 TABLET ORAL
Qty: 40 | Refills: 0
Start: 2021-07-24 | End: 2021-07-28

## 2021-07-24 RX ADMIN — ENOXAPARIN SODIUM 40 MILLIGRAM(S): 100 INJECTION SUBCUTANEOUS at 13:12

## 2021-07-24 RX ADMIN — Medication 50 MILLIGRAM(S): at 06:25

## 2021-07-24 RX ADMIN — PANTOPRAZOLE SODIUM 40 MILLIGRAM(S): 20 TABLET, DELAYED RELEASE ORAL at 13:12

## 2021-07-24 RX ADMIN — Medication 15 MILLIGRAM(S): at 00:57

## 2021-07-24 RX ADMIN — Medication 15 MILLIGRAM(S): at 06:25

## 2021-07-24 RX ADMIN — Medication 15 MILLIGRAM(S): at 17:23

## 2021-07-24 RX ADMIN — Medication 15 MILLIGRAM(S): at 06:55

## 2021-07-24 RX ADMIN — Medication 15 MILLIGRAM(S): at 13:12

## 2021-07-24 RX ADMIN — Medication 50 MILLIGRAM(S): at 17:23

## 2021-07-24 RX ADMIN — SCOPALAMINE 1 PATCH: 1 PATCH, EXTENDED RELEASE TRANSDERMAL at 05:42

## 2021-07-24 RX ADMIN — Medication 15 MILLIGRAM(S): at 00:27

## 2021-07-24 NOTE — PROGRESS NOTE ADULT - SUBJECTIVE AND OBJECTIVE BOX
POD2: Robot-assisted laparoscopic Nissen fundoplication for hiatal hernia  SUBJECTIVE: Patient seen and examined bedside. C/o mild discomfort when swallowing. Pain adequately controlled, no nausea, no vomiting, tolerating CLD.     enoxaparin Injectable 40 milliGRAM(s) SubCutaneous every 24 hours  metoprolol tartrate 50 milliGRAM(s) Oral two times a day      Vital Signs Last 24 Hrs  T(C): 37.1 (24 Jul 2021 17:04), Max: 37.1 (24 Jul 2021 17:04)  T(F): 98.7 (24 Jul 2021 17:04), Max: 98.7 (24 Jul 2021 17:04)  HR: 63 (24 Jul 2021 17:04) (61 - 78)  BP: 152/72 (24 Jul 2021 17:04) (152/72 - 186/82)  BP(mean): --  RR: 20 (24 Jul 2021 17:04) (14 - 20)  SpO2: 95% (24 Jul 2021 17:04) (92% - 96%)  I&O's Detail    23 Jul 2021 07:01  -  24 Jul 2021 07:00  --------------------------------------------------------  IN:    Lactated Ringers: 780 mL  Total IN: 780 mL    OUT:    Voided (mL): 1550 mL  Total OUT: 1550 mL    Total NET: -770 mL      24 Jul 2021 07:01  -  24 Jul 2021 19:56  --------------------------------------------------------  IN:    Oral Fluid: 940 mL  Total IN: 940 mL    OUT:    Voided (mL): 900 mL  Total OUT: 900 mL    Total NET: 40 mL          General: NAD, resting comfortably in bed  C/V: NSR  Pulm: Nonlabored breathing, no respiratory distress  Abd: soft, nondistended, appropriate incisional tenderness, incisions CDI, no rebound, no guarding  Extrem: WWP, no edema, SCDs in place        LABS:                        10.2   7.81  )-----------( 212      ( 24 Jul 2021 06:53 )             32.2     07-24    143  |  108  |  15  ----------------------------<  133<H>  4.3   |  26  |  1.08    Ca    9.2      24 Jul 2021 06:53  Phos  3.7     07-24  Mg     2.1     07-24            RADIOLOGY & ADDITIONAL STUDIES:  
POST-OPERATIVE NOTE    Procedure: Robot-assisted laparoscopic Nissen fundoplication for hiatal hernia     Diagnosis/Indication: Hiatal hernia    Surgeon: Dr. Mariscal    S: Patient seen and evaluated. Patient is primarily non-english speaking (Hungarian). Patient says that she has been experiencing moderate upper abdominal, lower chest discomfort. She denies any SOB. Patient denies any emesis. She further denies any calf pain.     O:  T(C): 36.1 (07-22-21 @ 14:52), Max: 36.1 (07-22-21 @ 14:52)  T(F): 97 (07-22-21 @ 14:52), Max: 97 (07-22-21 @ 14:52)  HR: 67 (07-22-21 @ 15:57) (62 - 70)  BP: 138/61 (07-22-21 @ 15:57) (120/58 - 138/61)  RR: 19 (07-22-21 @ 15:57) (13 - 23)  SpO2: 95% (07-22-21 @ 15:57) (95% - 100%)  Wt(kg): --          Gen: NAD, resting comfortably in bed  C/V: Normal rate on Tele monitor in PACU.   Pulm: Nonlabored breathing, no respiratory distress  Abd: soft, mildly distended abdomen. 5 port incision sites c/d/i; no erythema, purulence, or drainage; appropriately TTP.   Extrem: WWP, no calf edema, SCDs in place      A/P: 70yFemale s/p above procedure  Diet: NPO  IVF: 125mL/hr  Pain/nausea control  DVT ppx: SCD's, lovenox POD1  Dispo plan: pending
SUBJECTIVE:   overnight pain controlled, post op hgb: 12.1 (11.4), failed TOV, bs >620, Moreland inserted;    doing well this morning; moderate pain over mid chest down to epigastrium; no other concerns  or complaints       MEDICATIONS  (PRN):  acetaminophen   Tablet .. 650 milliGRAM(s) Oral every 6 hours PRN Mild Pain (1 - 3)  acetaminophen  IVPB .. 1000 milliGRAM(s) IV Intermittent once PRN Mild Pain (1 - 3)  ondansetron Injectable 4 milliGRAM(s) IV Push every 4 hours PRN Nausea      I&O's Detail    22 Jul 2021 07:01  -  23 Jul 2021 07:00  --------------------------------------------------------  IN:    Lactated Ringers: 2000 mL  Total IN: 2000 mL    OUT:    Indwelling Catheter - Urethral (mL): 350 mL  Total OUT: 350 mL    Total NET: 1650 mL          T(C): 37.1 (07-23-21 @ 04:00), Max: 37.3 (07-22-21 @ 20:53)  HR: 69 (07-23-21 @ 04:00) (62 - 98)  BP: 167/80 (07-23-21 @ 04:00) (120/58 - 167/80)  RR: 16 (07-23-21 @ 04:00) (13 - 23)  SpO2: 99% (07-23-21 @ 04:00) (93% - 100%)    GENERAL: NAD, Resting comfortably in bed, awake, opens eyes spontaneously  HEENT: NCAT, MMM, Normal conjunctiva, PERRL  RESP: Nonlabored breathing, No respiratory distress  CARD: Normal rate, Normal peripheral perfusion  GI: Soft, nondistended minimal tenderness over the incisions, No guarding, No rebound tenderness  NEURO: AAOx3, No focal motor or sensory deficits  PSYCH: Affect and characteristics of appearance, verbalizations, and behaviors are appropriate    LABS:                        10.5   9.78  )-----------( 211      ( 23 Jul 2021 05:50 )             33.6     07-23    139  |  104  |  17  ----------------------------<  113<H>  4.4   |  26  |  1.22    Ca    9.0      23 Jul 2021 05:50  Phos  3.6     07-23  Mg     2.0     07-23        RADIOLOGY & ADDITIONAL STUDIES:

## 2021-07-24 NOTE — PROGRESS NOTE ADULT - ASSESSMENT
70 year old female with PMH of GERD non-responsive to medications HTN, HLD, and PSH of Tummy tuck procedure (abdominoplasty), 3 c- section, and bilateral breast reduction surgery, now s/p Robot-assisted laparoscopic Nissen fundoplication for hiatal hernia 7/22    plan:  - advance to clear liquids  - will perform Upper GI  - MIVF: continue fluids 125mL/hr LR, will d/c once tolerating adequate po intake   - Protonix  - will d/c silva this morning and follow up with trial of void; if she fails, perform straight catheterization as needed   - Toradol (start 12 hours after case) q6 hours  - Tylenol IV -> PO as tolerated  - OOB/AMB/IS/SCDs  - scd/lovenox for DVT prophylaxis   
70 year old female with PMH of GERD non-responsive to medications HTN, HLD, and PSH of Tummy tuck procedure (abdominoplasty), 3 c- section, and bilateral breast reduction surgery, now s/p Robot-assisted laparoscopic Nissen fundoplication for hiatal hernia 7/22    plan:  - advance to clear liquids  - Protonix  - Toradol (start 12 hours after case) q6 hours  - Tylenol IV -> PO as tolerated  - OOB/AMB/IS/SCDs  - scd/lovenox for DVT prophylaxis

## 2021-07-24 NOTE — DISCHARGE NOTE NURSING/CASE MANAGEMENT/SOCIAL WORK - PATIENT PORTAL LINK FT
You can access the FollowMyHealth Patient Portal offered by [facility] by registering at the following website: [email or web address]. By joining [email or web address]’s FollowMyHealth portal, you will also be able to view your health information using other applications (apps) compatible with our system.

## 2021-07-26 PROBLEM — I10 ESSENTIAL (PRIMARY) HYPERTENSION: Chronic | Status: ACTIVE | Noted: 2021-07-21

## 2021-07-26 PROBLEM — E78.5 HYPERLIPIDEMIA, UNSPECIFIED: Chronic | Status: ACTIVE | Noted: 2021-07-21

## 2021-07-30 ENCOUNTER — APPOINTMENT (OUTPATIENT)
Dept: BARIATRICS | Facility: CLINIC | Age: 71
End: 2021-07-30
Payer: MEDICARE

## 2021-07-30 VITALS
DIASTOLIC BLOOD PRESSURE: 74 MMHG | BODY MASS INDEX: 32.15 KG/M2 | HEIGHT: 65 IN | HEART RATE: 66 BPM | TEMPERATURE: 96.6 F | SYSTOLIC BLOOD PRESSURE: 134 MMHG | WEIGHT: 193 LBS | OXYGEN SATURATION: 97 %

## 2021-07-30 PROCEDURE — 99024 POSTOP FOLLOW-UP VISIT: CPT

## 2021-07-30 NOTE — HISTORY OF PRESENT ILLNESS
[de-identified] : Pt is a 69 y/o F 1 week s/p HH repair who presents today with her daughter feeling well. Pt's daughter assisted with translating during the encounter and  spoke to the pt on speaker phone as well. Pt''s daughter said her mother has not had any fevers, chest pn, sob, calf pain, n,v,c,d, reflux. States that swallowing is uncomfortable, reviewed the importance of slowly sipping and taking her time with eating. Pt has been having clear fluids, diet progress to pureed foods as tolerated. Pt to meet with nutrition today to review puree diet. Incisions healing well. No other concerns at this time.

## 2021-08-20 ENCOUNTER — APPOINTMENT (OUTPATIENT)
Dept: SURGERY | Facility: CLINIC | Age: 71
End: 2021-08-20
Payer: MEDICARE

## 2021-08-20 VITALS
HEART RATE: 60 BPM | DIASTOLIC BLOOD PRESSURE: 79 MMHG | OXYGEN SATURATION: 98 % | TEMPERATURE: 97.6 F | WEIGHT: 189 LBS | HEIGHT: 65 IN | BODY MASS INDEX: 31.49 KG/M2 | SYSTOLIC BLOOD PRESSURE: 123 MMHG

## 2021-08-20 PROCEDURE — 99024 POSTOP FOLLOW-UP VISIT: CPT

## 2021-08-20 NOTE — END OF VISIT
[FreeTextEntry3] : All medical record entries made by the Scribe were at my, Dr. Mariscal's, discretion and personally dictated by me on 08/20/2021. I have reviewed the chart and agree that the record accurately reflects my personal performance of the history, physical exam, assessment and plan. I have also personally directed, reviewed and agreed to the chart.

## 2021-08-20 NOTE — ASSESSMENT
[FreeTextEntry1] : Pt is a 71 y/o F 1 month s/p HH repair and Nissen fundoplication who presents today for post op. Doing well. Tolerating liquid diet. Pt will start to advance diet to soft foods. Patient will meet our dietician to discuss nutritional counseling. She will follow up in 3 months.\par \par

## 2021-08-20 NOTE — HISTORY OF PRESENT ILLNESS
[de-identified] : Pt is a 69 y/o F 1 month s/p HH repair and Nissen fundoplication who presents today for post op. She is doing well overall. She reports she is able to tolerate a liquid diet. She wants to know if she can travel and if she can start to exercise. \par \par

## 2021-08-20 NOTE — ADDENDUM
[FreeTextEntry1] : This note was written by Bree Oneil on 08/20/2021 acting as scribe for Dr. Mariscal

## 2021-11-12 ENCOUNTER — APPOINTMENT (OUTPATIENT)
Dept: SURGERY | Facility: CLINIC | Age: 71
End: 2021-11-12
Payer: MEDICARE

## 2021-11-12 VITALS
HEIGHT: 65 IN | OXYGEN SATURATION: 99 % | SYSTOLIC BLOOD PRESSURE: 127 MMHG | HEART RATE: 69 BPM | DIASTOLIC BLOOD PRESSURE: 81 MMHG | TEMPERATURE: 97.5 F | BODY MASS INDEX: 31.32 KG/M2 | WEIGHT: 188 LBS

## 2021-11-12 DIAGNOSIS — K44.9 DIAPHRAGMATIC HERNIA W/OUT OBSTRUCTION OR GANGRENE: ICD-10-CM

## 2021-11-12 DIAGNOSIS — K29.70 GASTRITIS, UNSPECIFIED, W/OUT BLEEDING: ICD-10-CM

## 2021-11-12 DIAGNOSIS — Z00.00 ENCOUNTER FOR GENERAL ADULT MEDICAL EXAMINATION W/OUT ABNORMAL FINDINGS: ICD-10-CM

## 2021-11-12 PROCEDURE — 99212 OFFICE O/P EST SF 10 MIN: CPT

## 2021-11-12 NOTE — HISTORY OF PRESENT ILLNESS
[de-identified] : Pt is a 70 y/o F 3 mo s/p Nissen Fundoplication who presents today feeling well. States she is doing well overall since her sx, states she is tolerating solid foods like chicken but still mashes some of her foods. States rice creates a stuck feeling in her chest, advised pt to avoid rice and focus on chewing very well, swallowing slowly, taking her time eating. Advised pt that her meals should take 25-30 mins to finish eating. Denies n,v,c,d,reflux, abd pain. States overall she is happy with her post op progress. Will work nutrition today and obtain labs.

## 2021-12-20 NOTE — DISCHARGE NOTE NURSING/CASE MANAGEMENT/SOCIAL WORK - NSDCVIVACCINE_GEN_ALL_CORE_FT
Medication:   Requested Prescriptions     Pending Prescriptions Disp Refills    rOPINIRole (REQUIP) 0.25 MG tablet 30 tablet 0     Sig: Take 1 tablet by mouth nightly      Last Filled:      Patient Phone Number: [phone] (home) [phone] (work)    Last appt: 11/18/2021   Next appt: Visit date not found    Last OARRS: No flowsheet data found.   PDMP Monitoring:    Last PDMP Kara March as Reviewed [facility]):  Review User Review Instant Review Result   Douglas Puente 11/19/2021  9:33 AM Reviewed PDMP [1]     Preferred Pharmacy:   [address] [facility], [address] [facility] [phone] - F [phone]  [address] [place] [address]. [address]  Phone: [phone] Fax: [phone]
No Vaccines Administered.

## 2022-01-12 LAB
25(OH)D3 SERPL-MCNC: 26 NG/ML
A-TOCOPHEROL VIT E SERPL-MCNC: 11.3 MG/L
ALBUMIN SERPL ELPH-MCNC: 4.6 G/DL
ALP BLD-CCNC: 64 U/L
ALT SERPL-CCNC: 19 U/L
ANION GAP SERPL CALC-SCNC: 13 MMOL/L
AST SERPL-CCNC: 21 U/L
BASOPHILS # BLD AUTO: 0.04 K/UL
BASOPHILS NFR BLD AUTO: 0.7 %
BETA+GAMMA TOCOPHEROL SERPL-MCNC: 1 MG/L
BILIRUB SERPL-MCNC: 0.2 MG/DL
BUN SERPL-MCNC: 21 MG/DL
CA-I SERPL-SCNC: 1.25 MMOL/L
CALCIUM SERPL-MCNC: 10.1 MG/DL
CALCIUM SERPL-MCNC: 10.1 MG/DL
CHLORIDE SERPL-SCNC: 103 MMOL/L
CHOLEST SERPL-MCNC: 147 MG/DL
CO2 SERPL-SCNC: 26 MMOL/L
CREAT SERPL-MCNC: 1.2 MG/DL
EOSINOPHIL # BLD AUTO: 0.19 K/UL
EOSINOPHIL NFR BLD AUTO: 3.4 %
ESTIMATED AVERAGE GLUCOSE: 114 MG/DL
FOLATE SERPL-MCNC: 7.3 NG/ML
GLUCOSE SERPL-MCNC: 95 MG/DL
HBA1C MFR BLD HPLC: 5.6 %
HCT VFR BLD CALC: 37 %
HDLC SERPL-MCNC: 68 MG/DL
HGB BLD-MCNC: 11.8 G/DL
IMM GRANULOCYTES NFR BLD AUTO: 0.4 %
INR PPP: 0.96 RATIO
IRON SATN MFR SERPL: 28 %
IRON SERPL-MCNC: 81 UG/DL
LDLC SERPL CALC-MCNC: 53 MG/DL
LYMPHOCYTES # BLD AUTO: 1.83 K/UL
LYMPHOCYTES NFR BLD AUTO: 32.6 %
MAN DIFF?: NORMAL
MCHC RBC-ENTMCNC: 29.6 PG
MCHC RBC-ENTMCNC: 31.9 GM/DL
MCV RBC AUTO: 92.7 FL
MONOCYTES # BLD AUTO: 0.48 K/UL
MONOCYTES NFR BLD AUTO: 8.6 %
NEUTROPHILS # BLD AUTO: 3.05 K/UL
NEUTROPHILS NFR BLD AUTO: 54.3 %
NONHDLC SERPL-MCNC: 79 MG/DL
PARATHYROID HORMONE INTACT: 58 PG/ML
PLATELET # BLD AUTO: 190 K/UL
POTASSIUM SERPL-SCNC: 4.6 MMOL/L
PREALB SERPL NEPH-MCNC: 32 MG/DL
PROT SERPL-MCNC: 6.9 G/DL
PT BLD: 11.4 SEC
RBC # BLD: 3.99 M/UL
RBC # FLD: 13.3 %
SODIUM SERPL-SCNC: 142 MMOL/L
TIBC SERPL-MCNC: 289 UG/DL
TRIGL SERPL-MCNC: 128 MG/DL
TSH SERPL-ACNC: 1.3 UIU/ML
UIBC SERPL-MCNC: 209 UG/DL
VIT A SERPL-MCNC: 68.2 UG/DL
VIT B1 SERPL-MCNC: 115 NMOL/L
VIT B12 SERPL-MCNC: 626 PG/ML
WBC # FLD AUTO: 5.61 K/UL
ZINC SERPL-MCNC: 87 UG/DL

## 2022-03-22 ENCOUNTER — APPOINTMENT (OUTPATIENT)
Dept: BARIATRICS | Facility: CLINIC | Age: 72
End: 2022-03-22

## 2023-03-10 ENCOUNTER — EMERGENCY (EMERGENCY)
Facility: HOSPITAL | Age: 73
LOS: 1 days | Discharge: HOME | End: 2023-03-10
Attending: EMERGENCY MEDICINE | Admitting: EMERGENCY MEDICINE
Payer: MEDICARE

## 2023-03-10 VITALS
TEMPERATURE: 99 F | DIASTOLIC BLOOD PRESSURE: 96 MMHG | WEIGHT: 184.97 LBS | RESPIRATION RATE: 18 BRPM | SYSTOLIC BLOOD PRESSURE: 152 MMHG | HEART RATE: 79 BPM | HEIGHT: 65 IN | OXYGEN SATURATION: 94 %

## 2023-03-10 DIAGNOSIS — Z98.890 OTHER SPECIFIED POSTPROCEDURAL STATES: Chronic | ICD-10-CM

## 2023-03-10 DIAGNOSIS — Z98.891 HISTORY OF UTERINE SCAR FROM PREVIOUS SURGERY: Chronic | ICD-10-CM

## 2023-03-10 PROCEDURE — 99284 EMERGENCY DEPT VISIT MOD MDM: CPT

## 2023-03-10 RX ORDER — LIDOCAINE 4 G/100G
1 CREAM TOPICAL ONCE
Refills: 0 | Status: COMPLETED | OUTPATIENT
Start: 2023-03-10 | End: 2023-03-10

## 2023-03-10 RX ORDER — CYCLOBENZAPRINE HYDROCHLORIDE 10 MG/1
5 TABLET, FILM COATED ORAL ONCE
Refills: 0 | Status: COMPLETED | OUTPATIENT
Start: 2023-03-10 | End: 2023-03-10

## 2023-03-10 RX ORDER — CYCLOBENZAPRINE HYDROCHLORIDE 10 MG/1
1 TABLET, FILM COATED ORAL
Qty: 9 | Refills: 0
Start: 2023-03-10 | End: 2023-03-12

## 2023-03-10 RX ORDER — KETOROLAC TROMETHAMINE 30 MG/ML
30 SYRINGE (ML) INJECTION ONCE
Refills: 0 | Status: DISCONTINUED | OUTPATIENT
Start: 2023-03-10 | End: 2023-03-10

## 2023-03-10 RX ADMIN — CYCLOBENZAPRINE HYDROCHLORIDE 5 MILLIGRAM(S): 10 TABLET, FILM COATED ORAL at 06:11

## 2023-03-10 RX ADMIN — Medication 30 MILLIGRAM(S): at 06:12

## 2023-03-10 RX ADMIN — LIDOCAINE 1 PATCH: 4 CREAM TOPICAL at 06:10

## 2023-03-10 NOTE — ED ADULT NURSE NOTE - OBJECTIVE STATEMENT
Pt is a 74 y/o F c/o low back pain. Patient BIBEMS c/o of chronic low back pain. Pt reports history of scoliosis and arthritis. Patient denies urinary or bowel incontinence. Pt denies fever.

## 2023-03-10 NOTE — ED PROVIDER NOTE - DIFFERENTIAL DIAGNOSIS
Differential Diagnosis DDx includes but is not limited to- lumbar radiculopathy, sciatica, herniated disc, clinically unlikely to be acute cord compression

## 2023-03-10 NOTE — ED ADULT TRIAGE NOTE - GLASGOW COMA SCALE: SCORE, MLM
Ice to the area of swelling  Rest  Tylenol or Motrin as needed  Follow-up with Dr Yadi Golden if not improved 15

## 2023-03-10 NOTE — ED PROVIDER NOTE - PHYSICAL EXAMINATION
General: well appearing, no acute distress, AOx3  Skin: no rash, no pallor  Head: normocephalic, atraumatic  Eyes: clear conjunctiva, EOMI  ENMT: airway patent, no nasal discharge  Cardiovascular: normal rate, normal rhythm, S1/S2  Pulmonary: clear to auscultation bilaterally, no rales, rhonchi, or wheeze  Abdomen: soft, nontender  Musculoskeletal: moving extremities well, no deformity, scoliotic spine, no midline tenderness, tender in L gluteus, negative SRL b/l, sensation intact of b/l lower extremities   Psych: normal mood, normal affect

## 2023-03-10 NOTE — ED ADULT TRIAGE NOTE - CHIEF COMPLAINT QUOTE
BIBA [facility]. Pt states shes had lower back pain for the past week and yesterday the pain worsened to her left leg. Pt took 1000mg of Tylenol last night appx 0200 with some relief. Pt denies trauma or further associated complaints at triage. PMH Scoliosis and Arthritis. NKDA.

## 2023-03-10 NOTE — ED PROVIDER NOTE - CLINICAL SUMMARY MEDICAL DECISION MAKING FREE TEXT BOX
Enrique Owens, PGY-3- 72 year old female with scoliosis and back pain worsening x1 week, now acutely worse. Assoc with tingling down L leg. No trauma or falls. No red flag sxs. Likely lumbar radiculopathy. PCP follow up advised and likely PT eval/MRI indicated. No indication for STAT MRI at this time. No urinary or bowel incontinence. Will treat symptomatically and dc home. NVI.

## 2023-03-10 NOTE — ED PROVIDER NOTE - PROGRESS NOTE DETAILS
Enrique Owens, PGY-3- pt given meds in ED. Flexeril sent to pharmacy. PCP f/u advised for PT and MRI imaging if indicated. Advised to discuss if spine surgeon evaluation is warranted. Discussed results of work up with patient. Patient agrees with plan to discharge home. All questions answered regarding plan. Strict return precautions given.

## 2023-03-10 NOTE — ED PROVIDER NOTE - CARE PROVIDER_API CALL
Jonny Sanchez)  PhysicalRehab Medicine  [address] [place] [address]  Phone: [phone]  Fax: [phone]  Follow Up Time: Routine

## 2023-03-10 NOTE — ED PROVIDER NOTE - NS ED ATTENDING STATEMENT MOD
I have seen and examined this patient and fully participated in the care of this patient as the teaching attending.  The service was shared with the VICENTE.  I reviewed and verified the documentation and independently performed the documented:

## 2023-03-10 NOTE — ED PROVIDER NOTE - NSFOLLOWUPINSTRUCTIONS_ED_ALL_ED_FT
Please take Ibuprofen 400 mg every 6 hours and Tylenol 975 mg every 6 hours for pain. Please follow all pharmacy packaging instructions and warnings. Please take the Ibuprofen with food.  Please buy Salonpas Lidocaine patches over the counter at the pharmacy. Use as directed on packaging.  You can take the Flexeril as needed for muscle relaxation. Please follow all pharmacy packaging instructions and warnings.  Please return for severe pain, falls, unable to walk, urinary or bowel incontinence, fever Please take Ibuprofen 400 mg every 6 hours and Tylenol 975 mg every 6 hours for pain. Please follow all pharmacy packaging instructions and warnings. Please take the Ibuprofen with food.  Please buy Salonpas Lidocaine patches over the counter at the pharmacy. Use as directed on packaging.  You can take the Flexeril as needed for muscle relaxation. Please follow all pharmacy packaging instructions and warnings.  Please return for severe pain, falls, unable to walk, urinary or bowel incontinence, fever    Dr. Jonny Sanchez  [facility]  [address]  [address] [place] [address]  Phone: [phone]

## 2023-03-10 NOTE — ED PROVIDER NOTE - ATTENDING CONTRIBUTION TO CARE
I saw and evaluated the patient. I discussed the case with the resident and agree with the findings and helped develop the plan of care as documented in the resident's note. I agree with the findings and plan of care as documented in the resident's note.

## 2023-03-10 NOTE — ED ADULT TRIAGE NOTE - BP NONINVASIVE DIASTOLIC (MM HG)
96 Genital Herpes  Genital herpes is a common sexually transmitted infection (STI) that is caused by a virus. The virus spreads from person to person through sexual contact. Infection can cause itching, blisters, and sores around the genitals or rectum. Symptoms may last several days and then go away This is called an outbreak. However, the virus remains in your body, so you may have more outbreaks in the future. The time between outbreaks varies and can be months or years.  Genital herpes affects men and women. It is particularly concerning for pregnant women because the virus can be passed to the baby during delivery and can cause serious problems. Genital herpes is also a concern for people who have a weak disease-fighting (immune) system.  What are the causes?  This condition is caused by the herpes simplex virus (HSV) type 1 or type 2. The virus may spread through:  · Sexual contact with an infected person, including vaginal, anal, and oral sex.  · Contact with fluid from a herpes sore.  · The skin. This means that you can get herpes from an infected partner even if he or she does not have a visible sore or does not know that he or she is infected.    What increases the risk?  You are more likely to develop this condition if:  · You have sex with many partners.  · You do not use latex condoms during sex.    What are the signs or symptoms?  Most people do not have symptoms (asymptomatic) or have mild symptoms that may be mistaken for other skin problems. Symptoms may include:  · Small red bumps near the genitals, rectum, or mouth. These bumps turn into blisters and then turn into sores.  · Flu-like symptoms, including:  ? Fever.  ? Body aches.  ? Swollen lymph nodes.  ? Headache.  · Painful urination.  · Pain and itching in the genital area or rectal area.  · Vaginal discharge.  · Tingling or shooting pain in the legs and buttocks.    Generally, symptoms are more severe and last longer during the first (primary)  outbreak. Flu-like symptoms are also more common during the primary outbreak.  How is this diagnosed?  Genital herpes may be diagnosed based on:  · A physical exam.  · Your medical history.  · Blood tests.  · A test of a fluid sample (culture) from an open sore.    How is this treated?  There is no cure for this condition, but treatment with antiviral medicines that are taken by mouth (orally) can do the following:  · Speed up healing and relieve symptoms.  · Help to reduce the spread of the virus to sexual partners.  · Limit the chance of future outbreaks, or make future outbreaks shorter.  · Lessen symptoms of future outbreaks.    Your health care provider may also recommend pain relief medicines, such as aspirin or ibuprofen.  Follow these instructions at home:  Sexual activity  · Do not have sexual contact during active outbreaks.  · Practice safe sex. Latex condoms and female condoms may help prevent the spread of the herpes virus.  General instructions  · Keep the affected areas dry and clean.  · Take over-the-counter and prescription medicines only as told by your health care provider.  · Avoid rubbing or touching blisters and sores. If you do touch blisters or sores:  ? Wash your hands thoroughly with soap and water.  ? Do not touch your eyes afterward.  · To help relieve pain or itching, you may take the following actions as directed by your health care provider:  ? Apply a cold, wet cloth (cold compress) to affected areas 4-6 times a day.  ? Apply a substance that protects your skin and reduces bleeding (astringent).  ? Apply a gel that helps relieve pain around sores (lidocaine gel).  ? Take a warm, shallow bath that cleans the genital area (sitz bath).  · Keep all follow-up visits as told by your health care provider. This is important.  How is this prevented?  · Use condoms. Although anyone can get genital herpes during sexual contact, even with the use of a condom, a condom can provide some  protection.  · Avoid having multiple sexual partners.  · Talk with your sexual partner about any symptoms either of you may have. Also, talk with your partner about any history of STIs.  · Get tested for STIs before you have sex. Ask your partner to do the same.  · Do not have sexual contact if you have symptoms of genital herpes.  Contact a health care provider if:  · Your symptoms are not improving with medicine.  · Your symptoms return.  · You have new symptoms.  · You have a fever.  · You have abdominal pain.  · You have redness, swelling, or pain in your eye.  · You notice new sores on other parts of your body.  · You are a woman and experience bleeding between menstrual periods.  · You have had herpes and you become pregnant or plan to become pregnant.  Summary  · Genital herpes is a common sexually transmitted infection (STI) that is caused by the herpes simplex virus (HSV) type 1 or type 2.  · These viruses are most often spread through sexual contact with an infected person.  · You are more likely to develop this condition if you have sex with many partners or you have unprotected sex.  · Most people do not have symptoms (asymptomatic) or have mild symptoms that may be mistaken for other skin problems. Symptoms occur as outbreaks that may happen months or years apart.  · There is no cure for this condition, but treatment with oral antiviral medicines can reduce symptoms, reduce the chance of spreading the virus to a partner, prevent future outbreaks, or shorten future outbreaks.  This information is not intended to replace advice given to you by your health care provider. Make sure you discuss any questions you have with your health care provider.  Document Released: 12/15/2001 Document Revised: 11/17/2017 Document Reviewed: 11/17/2017  [employer] Interactive Patient Education © 2019 [employer]

## 2023-03-10 NOTE — ED PROVIDER NOTE - OBJECTIVE STATEMENT
Enrique Owens, PGY-3- 72 year old female with a pmhx of arthritis, HTN, scoliosis, presents to ED with daughter for evaluation of lumbar back pain. Pt has had discomfort x1 week. Progressively worsening sxs. Tingling and radiation down L leg. No recent trauma, heavy lifting, or falls. No fever, chills, dysuria, urinary/bowel incontinence. Pt took Tylenol PTA with minimal relief. Is ambulatory. NKDA.

## 2023-03-10 NOTE — ED ADULT TRIAGE NOTE - HEART RATE (BEATS/MIN)
Received fax from [employer] for diabetes supplies (precription-pump supplies).  Confirmed pt preferred distributor.  Paperwork completed, signed by Dr Marrero and faxed back.  LOV: 2/25/2021  FUV: 9/23/2021    
79

## 2023-03-10 NOTE — ED PROVIDER NOTE - PATIENT PORTAL LINK FT
You can access the FollowMyHealth Patient Portal offered by [facility] by registering at the following website: [email or web address]. By joining [employer]’s FollowMyHealth portal, you will also be able to view your health information using other applications (apps) compatible with our system.

## 2023-03-13 DIAGNOSIS — M54.50 LOW BACK PAIN, UNSPECIFIED: ICD-10-CM

## 2023-03-13 DIAGNOSIS — Z98.890 OTHER SPECIFIED POSTPROCEDURAL STATES: ICD-10-CM

## 2023-03-13 DIAGNOSIS — Z87.39 PERSONAL HISTORY OF OTHER DISEASES OF THE MUSCULOSKELETAL SYSTEM AND CONNECTIVE TISSUE: ICD-10-CM

## 2023-03-13 DIAGNOSIS — M19.90 UNSPECIFIED OSTEOARTHRITIS, UNSPECIFIED SITE: ICD-10-CM

## 2023-03-13 DIAGNOSIS — I10 ESSENTIAL (PRIMARY) HYPERTENSION: ICD-10-CM

## 2023-03-13 DIAGNOSIS — R20.2 PARESTHESIA OF SKIN: ICD-10-CM

## 2023-03-13 DIAGNOSIS — E78.5 HYPERLIPIDEMIA, UNSPECIFIED: ICD-10-CM

## 2023-03-16 ENCOUNTER — APPOINTMENT (OUTPATIENT)
Dept: RADIOLOGY | Facility: CLINIC | Age: 73
End: 2023-03-16
Payer: MEDICARE

## 2023-03-16 ENCOUNTER — OUTPATIENT (OUTPATIENT)
Dept: OUTPATIENT SERVICES | Facility: HOSPITAL | Age: 73
LOS: 1 days | End: 2023-03-16

## 2023-03-16 ENCOUNTER — APPOINTMENT (OUTPATIENT)
Dept: PHYSICAL MEDICINE AND REHAB | Facility: CLINIC | Age: 73
End: 2023-03-16
Payer: MEDICARE

## 2023-03-16 ENCOUNTER — RESULT REVIEW (OUTPATIENT)
Age: 73
End: 2023-03-16

## 2023-03-16 VITALS
HEIGHT: 65 IN | HEART RATE: 7 BPM | OXYGEN SATURATION: 98 % | BODY MASS INDEX: 31.65 KG/M2 | WEIGHT: 190 LBS | DIASTOLIC BLOOD PRESSURE: 93 MMHG | SYSTOLIC BLOOD PRESSURE: 131 MMHG

## 2023-03-16 DIAGNOSIS — S33.6XXA SPRAIN OF SACROILIAC JOINT, INITIAL ENCOUNTER: ICD-10-CM

## 2023-03-16 DIAGNOSIS — M62.830 MUSCLE SPASM OF BACK: ICD-10-CM

## 2023-03-16 DIAGNOSIS — M53.3 SACROCOCCYGEAL DISORDERS, NOT ELSEWHERE CLASSIFIED: ICD-10-CM

## 2023-03-16 DIAGNOSIS — Z98.890 OTHER SPECIFIED POSTPROCEDURAL STATES: Chronic | ICD-10-CM

## 2023-03-16 DIAGNOSIS — Z98.891 HISTORY OF UTERINE SCAR FROM PREVIOUS SURGERY: Chronic | ICD-10-CM

## 2023-03-16 DIAGNOSIS — R26.89 OTHER ABNORMALITIES OF GAIT AND MOBILITY: ICD-10-CM

## 2023-03-16 DIAGNOSIS — M67.959 UNSPECIFIED DISORDER OF SYNOVIUM AND TENDON, UNSPECIFIED THIGH: ICD-10-CM

## 2023-03-16 PROCEDURE — 72110 X-RAY EXAM L-2 SPINE 4/>VWS: CPT | Mod: 26

## 2023-03-16 PROCEDURE — 73522 X-RAY EXAM HIPS BI 3-4 VIEWS: CPT | Mod: 26

## 2023-03-16 PROCEDURE — 99205 OFFICE O/P NEW HI 60 MIN: CPT

## 2023-03-16 RX ORDER — METHYLPREDNISOLONE 4 MG/1
4 TABLET ORAL
Qty: 1 | Refills: 0 | Status: ACTIVE | COMMUNITY
Start: 2023-03-16 | End: 1900-01-01

## 2023-03-16 RX ORDER — FAMOTIDINE 20 MG/1
20 TABLET, FILM COATED ORAL DAILY
Qty: 10 | Refills: 0 | Status: ACTIVE | COMMUNITY
Start: 2023-03-16 | End: 1900-01-01

## 2023-03-16 NOTE — ASSESSMENT
[FreeTextEntry1] :                                                       Assessment/Plan:\par \par CHEYENNE PARKS is a 72 year female with acute low back pain with radicular left leg pain here for initial consultation.\par \par Radiculitis, Lumbosacral, L5, Left\par Sacroiliac joint dysfunction, right\par Spasm of the lumbar paraspinous muscles\par Weakness of the hip, left\par Gait difficulty\par Antalgic gait\par \par - Tiers of treatment and management of above diagnosis(es) were discussed with patient\par - Optimal diet, weight, sleep, and lifestyle management to minimize stress and maximize well being counseling provided\par - Imaging reviewed and discussed with patient\par - Patient was advised to start a structured, targeted therapy program 2-3x/wk for 8 wks with goal toward HEP\par - Patient was educated on an appropriate home exercise program, provided with exercise recommendations, all questions answered\par - Patient may trial acetaminophen 1000mg up to TID PRN moderate to severe pain and to decrease average consumption of NSAIDs\par - Patient was advised to start gabapentin for their neuropathic pain symptoms. Patient will commence 300mg at bedtime, then increase to qAM dose. Patient provided with written instructions as well. All questions were answered and the patient displayed a clear understanding of the plan of care, including titration of gabapentin. The patient was informed about not taking this medication at the same time as any sleeping or muscle relaxant pills. Pt was also notified to stop, and contact our office, if it is too sedating, ankle swelling occurs and/or they experience suicidal thinking.\par - Patient was prescribed methocarbamol 750mg 1-2 tabs up to TID PRN muscle spasm, informed of sedative potential, advised to avoid driving, taking the subway, or operating heavy machinery, patient displayed a clear understanding of the plan including medication, its purpose and side effects, all questions answered\par - Patient was advised to apply cool compresses or warm heat to affected regions PRN\par - Radiographs of lumbosacral region ordered today \par \par - Patient was prescribed medrol dose pack (x1) with written instructions, all questions answered, informed of side effects of the medication.  Possible side effects, including hyperglycemia, GI upset, and GI bleed, reviewed with patient. In agreement with patient that potential pain reduction and anti-inflammatory benefits currently outweigh known side effect profile for oral corticosteroids. Patient instructed to immediately stop medication should she develop any abdominal pain, nausea, vomiting, bloody stools, or BRBPR\par \par - Educated about red flag symptoms including (but not limited to) new, worsened, or persistent: fever greater than 100F, bowel or bladder incontinence, bowel obstipation, inability to void urine, urinary leakage, Severe nausea or vomiting, Worsening numbness, worsening tingling/paresthesias, and/or new or progressive motor weakness; advised to seek immediate medical attention at his nearest Emergency department should they experience any of the above\par \par - MRI lumbar spine without contrast is indicated given that the pt has not improved with tylenol, ibuprofen, naproxen, meloxicam, they underwent non-diagnostic radiographic imaging of the region, and marked functional decline with focal motor weakness on exam of the LLE. Patient's imaging is medically necessary to outline targets for locally (interventional) directed treatments and/or guide surgical management.\par \par - Follow up in 2-3 weeks after imaging, in person in 2 months to assess their progress\par \par I have personally spent a total of at least 65 minutes preparing, reviewing internal and external records, explaining, counseling, and coordinating care for this patient encounter.\par \par Thank you, (s), for allowing me to participate in the care of your patient. Please do not hesitate to contact me with questions/concerns.\par \par Raul Jerry M.D.\par Sports and Interventional Spine\[facility] Department of Physical Medicine and Rehabilitation \par [facility] \par Email: [email or web address]\par \par [facility]\par Orth[facility] [address]\par [facility], 11th Floor\[place] [address]\[facility] \par Appointments: [phone]\par Fax: [phone]\[facility]

## 2023-03-16 NOTE — HISTORY OF PRESENT ILLNESS
[FreeTextEntry1] : Raul Jerry M.D.\par Sports Medicine and Interventional Spine\[facility] Department of Physical Medicine and Rehabilitation \par [facility] \[facility] Email: [email or web address] <[email or web address]>\par \par [facility]\par Orth[facility] [place] [facility]\par [address]\par [place], [address]th Floor\[place] [address]\par \par [facility] [place] at [facility]\par [address], 4th Floor\[place] [address]\par \par [facility]  \par [facility]\par [address], 6th [address]\[place] [address]\par \[facility] Appointments\par Phone: [phone]\par Fax: [phone]\par \par ----------------------------------------------------------------------------------------------------------------------------------------\par \par PATIENT: CHEYENNE PARKS \par MRN: [id number] \par YOB: 1950 \par DATE OF VISIT: 03/16/2023 \par \par Mar 16, 2023 \par \par \par Dear Drs.\par \par Thank you for referring CHEYENNE PARKS to my Sports and Interventional Spine practice and office. Enclosed is a copy of the patient's consultation/progress note, which includes my complete assessment and recent studies completed during the patient's evaluation.\par \par If you have questions or have any patients who require nonsurgical, non-opiate management of any sports, spine, or musculoskeletal conditions, please do not hesitate to contact my , Rema Moran at [phone].\par \par I look forward to taking care of your patients along with you.\par \par Sincerely,\par \par Raul\par \par Raul Jerry MD\par Sports, Interventional Spine, & Regenerative Musculoskeletal Medicine\par [facility] [place] at [facility]\par Email: [email or web address]\par \par \par                                                   Initial Consultation:\par CC: back and leg pain\par \par HPI:  This is the first visit to [facility] [place] [facility]ine [facility].  \par \par CHEYENNE PARKS presents with the chief complaint as above.  \par \par Initial Hx on 03/16/2023 :\par Presents in person to [facility]\par patient was brought to ED by EMS for left leg pain\par patient was treated with IM NSAIDs and with oral muscle relaxants\par patient cannot cite inciting event \par had previously experienced low back pain but no radiating leg pain \par The patient’s difficulties began 3/9/2023\par The pain is graded as 10/10\par The pain is described as burning pain \par The pain is constant\par The pain radiates in the LEFT LOWER limbs in a L5, S1 distribution\par The patient feels that the pain is overall persistent \par Patient denies other recent fall, MVA, injury, trauma, or accident besides presenting history above\par \par Aggravating: ambulation, prolonged sitting, prolonged standing, navigating stairs, getting out of bed, sit to stand transitional movements\par Alleviating: rest, activity modification, pharmacologic treatments\par \par Meds: denies regular PO pain medications\par Therapy Program: no recent structured targeted therapy program\par HEP: doing HEP regularly\par \par Assoc Sx:\par Reports intermittent numbness, tingling paresthesia in the left LOWER limbs in a L5, S1 distribution\par Otherwise denies numbness, Tingling\par Denies Focal motor weakness in the upper or lower limbs\par Denies New or worsened bowel or bladder incontinence\par Denies Saddle anesthesia\par Denies Using Orthotic(s)/Supportive devices\par Denies Swelling in the upper/lower extremities\par They also deny frequent tripping, falling\par \par ROS: A 14 point review of systems was completed. Positive findings are pain as described above. The remaining systems negative.\par \par Breast Cancer Surveillance: up to date\par COVID HX: reviewed\par \par Assoc Hx:\par Ambulates without assistive device\par Injection Hx: denies locally directed treatment to the area in question\par Imaging Hx: reviewed\par \par Level of functioning: indep with ambulation, indep with ADLs\par Living Situation: elevator accessible apartment dwelling with steps to enter

## 2023-03-16 NOTE — PHYSICAL EXAM
[FreeTextEntry1] : Gen: A+O x 3 in NAD\par Psych: Normal mood and affect. Responds appropriately to commands\par Eyes: Anicteric. No discharge. EOMI.\par Resp: Breathing unlabored\par CV: DP pulses 2+ and equal. No varicosities noted\par Ext: No c/c/e\par Skin: No lesions noted\par \par Gait:\par +antalgic \par +  reciprocating heel to toe\par able to stand on toes and heels WITH hand holding\par Tandem gait intact WITH hand holding\par Poor single leg standing balance\par Romberg negative\par \par Trendelenburg present with left stance leg\par \par Inspection: marked leftward curvature to the spine upon inspection\par Palpation: There is + tenderness over the midline spinous processes, paravertebral muscles of the thoracolumbar region\par Lumbar ROM: Flexion, extension, side-bending, rotation, limited in most planes\par +pain with lateral flexion\par +pain with oblique extension\par +pain with lateral rotation \par \par Hip ROM: neg pain at terminal ROM bilaterally.\par FAIR, FABERE negative bilaterally.\par \par 	Hip Flex       Knee Ext      Ankle Dorsi           EHL        Ankle Plantar\par Right	4/5	        5/5	                 5/5	          5/5	             4/5                           \par Left	4/5	        5/5	                 5/5	          4/5	             4/5                           \par \par Hip abduction R 4-/5 L 4-/5\par Hip adduction R 4-/5 L 4-/5\par Hip extension R 4-/5 L 4-/5\par Knee Flexion R 4-/5 L 4-/5\par \par Tone: Normal. No clonus.\par Sensation: Grossly intact to light touch bilateral lower limbs.\par Proprioception: Intact at big toes bilaterally.\par Reflexes: 2+ symmetric knee jerk, ankle jerk. \par Plantars downgoing bilaterally.\par \par SLR negative + left L5, 35 degrees\par Crossed SLR negative bilaterally.\par Slump Test + left L5

## 2023-03-23 ENCOUNTER — APPOINTMENT (OUTPATIENT)
Dept: MRI IMAGING | Facility: CLINIC | Age: 73
End: 2023-03-23
Payer: MEDICARE

## 2023-03-23 ENCOUNTER — OUTPATIENT (OUTPATIENT)
Dept: OUTPATIENT SERVICES | Facility: HOSPITAL | Age: 73
LOS: 1 days | End: 2023-03-23

## 2023-03-23 DIAGNOSIS — Z98.890 OTHER SPECIFIED POSTPROCEDURAL STATES: Chronic | ICD-10-CM

## 2023-03-23 DIAGNOSIS — Z98.891 HISTORY OF UTERINE SCAR FROM PREVIOUS SURGERY: Chronic | ICD-10-CM

## 2023-03-23 PROCEDURE — 72148 MRI LUMBAR SPINE W/O DYE: CPT | Mod: 26

## 2023-03-27 ENCOUNTER — APPOINTMENT (OUTPATIENT)
Dept: PHYSICAL MEDICINE AND REHAB | Facility: CLINIC | Age: 73
End: 2023-03-27
Payer: MEDICARE

## 2023-03-27 DIAGNOSIS — M51.27 OTHER INTERVERTEBRAL DISC DISPLACEMENT, LUMBOSACRAL REGION: ICD-10-CM

## 2023-03-27 DIAGNOSIS — M47.817 SPONDYLOSIS W/OUT MYELOPATHY OR RADICULOPATHY, LUMBOSACRAL REGION: ICD-10-CM

## 2023-03-27 DIAGNOSIS — D49.2 NEOPLASM OF UNSPECIFIED BEHAVIOR OF BONE, SOFT TISSUE, AND SKIN: ICD-10-CM

## 2023-03-27 DIAGNOSIS — M54.17 RADICULOPATHY, LUMBOSACRAL REGION: ICD-10-CM

## 2023-03-27 PROCEDURE — 99443: CPT

## 2023-03-28 PROBLEM — M51.27 HERNIATED NUCLEUS PULPOSUS OF LUMBOSACRAL REGION: Status: ACTIVE | Noted: 2023-03-27

## 2023-03-28 PROBLEM — D49.2 NERVE SHEATH TUMOR: Status: ACTIVE | Noted: 2023-03-27

## 2023-04-14 ENCOUNTER — RX RENEWAL (OUTPATIENT)
Age: 73
End: 2023-04-14

## 2023-04-18 ENCOUNTER — RX RENEWAL (OUTPATIENT)
Age: 73
End: 2023-04-18

## 2023-04-18 RX ORDER — GABAPENTIN 300 MG/1
300 CAPSULE ORAL TWICE DAILY
Qty: 60 | Refills: 0 | Status: ACTIVE | COMMUNITY
Start: 2023-03-16 | End: 1900-01-01

## 2023-05-22 ENCOUNTER — NON-APPOINTMENT (OUTPATIENT)
Age: 73
End: 2023-05-22

## 2023-05-22 ENCOUNTER — RX RENEWAL (OUTPATIENT)
Age: 73
End: 2023-05-22

## 2023-05-22 RX ORDER — METHOCARBAMOL 750 MG/1
750 TABLET, FILM COATED ORAL
Qty: 60 | Refills: 0 | Status: ACTIVE | COMMUNITY
Start: 2023-03-16 | End: 1900-01-01

## 2023-05-23 ENCOUNTER — TRANSCRIPTION ENCOUNTER (OUTPATIENT)
Age: 73
End: 2023-05-23

## 2023-05-23 ENCOUNTER — NON-APPOINTMENT (OUTPATIENT)
Age: 73
End: 2023-05-23

## 2023-05-23 ENCOUNTER — APPOINTMENT (OUTPATIENT)
Dept: HEART AND VASCULAR | Facility: CLINIC | Age: 73
End: 2023-05-23
Payer: MEDICARE

## 2023-05-23 VITALS
HEIGHT: 65 IN | DIASTOLIC BLOOD PRESSURE: 76 MMHG | OXYGEN SATURATION: 100 % | HEART RATE: 59 BPM | TEMPERATURE: 96.9 F | WEIGHT: 190 LBS | SYSTOLIC BLOOD PRESSURE: 138 MMHG | BODY MASS INDEX: 31.65 KG/M2

## 2023-05-23 DIAGNOSIS — I49.3 VENTRICULAR PREMATURE DEPOLARIZATION: ICD-10-CM

## 2023-05-23 DIAGNOSIS — Z01.810 ENCOUNTER FOR PREPROCEDURAL CARDIOVASCULAR EXAMINATION: ICD-10-CM

## 2023-05-23 PROCEDURE — 99205 OFFICE O/P NEW HI 60 MIN: CPT | Mod: 25

## 2023-05-23 PROCEDURE — 93000 ELECTROCARDIOGRAM COMPLETE: CPT | Mod: NC

## 2023-05-23 NOTE — HISTORY OF PRESENT ILLNESS
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 Says last time she did this was in March 2023\par - says she underwent stress testing in the past every time before major surgeries (per records hiatal hernia repair in 7/22/21 and R knee replacement in 10/2019)\par - ECG in 2021 w 3 PVCs, underlying rhythm: sinus; today w ?anterior infarct Q waves with poor RW progression which may also just be due to lead placement\par \par - takes ASA (for unclear reason since no established ASCVD on record or pt aware of), rosuvastatin 20, lisinopril-hctz 10-12.5, metoprolol succ 50 mg\par - cholesterol med taken during the past 2 years consistently\par \par Other meds: zolpidem 10 mg omeprazole 40 mg, pregabalin 50 mg, methocabamol 750 mg, bupropion 300 mg

## 2023-05-23 NOTE — PHYSICAL EXAM
[Well Developed] : well developed [Well Nourished] : well nourished [No Acute Distress] : no acute distress [Obese] : obese [Normal Conjunctiva] : normal conjunctiva [Normal Venous Pressure] : normal venous pressure [No Carotid Bruit] : no carotid bruit [Normal S1, S2] : normal S1, S2 [No Murmur] : no murmur [No Rub] : no rub [No Gallop] : no gallop [Clear Lung Fields] : clear lung fields [Good Air Entry] : good air entry [No Respiratory Distress] : no respiratory distress  [Soft] : abdomen soft [Non Tender] : non-tender [No Masses/organomegaly] : no masses/organomegaly [Normal Bowel Sounds] : normal bowel sounds [No Edema] : no edema [No Cyanosis] : no cyanosis [No Clubbing] : no clubbing [No Varicosities] : no varicosities [No Rash] : no rash [No Skin Lesions] : no skin lesions [Moves all extremities] : moves all extremities [No Focal Deficits] : no focal deficits [Normal Speech] : normal speech [Alert and Oriented] : alert and oriented [Normal memory] : normal memory [Abnormal Gait] : abnormal gait [de-identified] : slow, limited by pain

## 2023-05-23 NOTE — REVIEW OF SYSTEMS
[Numbness (Hypoesthesia)] : numbness [Tingling (Paresthesia)] : tingling [Negative] : Heme/Lymph [Joint Pain] : joint pain [SOB] : no shortness of breath [Dyspnea on exertion] : not dyspnea during exertion [Chest Discomfort] : no chest discomfort [Lower Ext Edema] : no extremity edema [de-identified] : pain back, waist, L leg

## 2023-05-23 NOTE — REASON FOR VISIT
[FreeTextEntry1] : CV Data Reviewed:\par \par ECG 2021: sinus rhythm w 3 monomorphic PVCs\par ECG 5/23/23: sinus rhythm w poor R wave progression, ?anterior Q waves possibly from lead placement

## 2023-05-24 ENCOUNTER — TRANSCRIPTION ENCOUNTER (OUTPATIENT)
Age: 73
End: 2023-05-24

## 2023-05-25 ENCOUNTER — RESULT REVIEW (OUTPATIENT)
Age: 73
End: 2023-05-25

## 2023-05-25 ENCOUNTER — OUTPATIENT (OUTPATIENT)
Dept: OUTPATIENT SERVICES | Facility: HOSPITAL | Age: 73
LOS: 1 days | End: 2023-05-25
Payer: MEDICARE

## 2023-05-25 DIAGNOSIS — I25.9 CHRONIC ISCHEMIC HEART DISEASE, UNSPECIFIED: ICD-10-CM

## 2023-05-25 DIAGNOSIS — Z98.891 HISTORY OF UTERINE SCAR FROM PREVIOUS SURGERY: Chronic | ICD-10-CM

## 2023-05-25 DIAGNOSIS — Z98.890 OTHER SPECIFIED POSTPROCEDURAL STATES: Chronic | ICD-10-CM

## 2023-05-25 PROCEDURE — 78452 HT MUSCLE IMAGE SPECT MULT: CPT | Mod: 26,1L

## 2023-05-25 PROCEDURE — 93018 CV STRESS TEST I&R ONLY: CPT | Mod: 1L

## 2023-05-25 PROCEDURE — 78452 HT MUSCLE IMAGE SPECT MULT: CPT

## 2023-05-25 PROCEDURE — 93017 CV STRESS TEST TRACING ONLY: CPT

## 2023-05-25 PROCEDURE — 93016 CV STRESS TEST SUPVJ ONLY: CPT | Mod: 1L

## 2023-05-25 PROCEDURE — A9500: CPT

## 2023-05-26 ENCOUNTER — TRANSCRIPTION ENCOUNTER (OUTPATIENT)
Age: 73
End: 2023-05-26

## 2023-05-30 ENCOUNTER — TRANSCRIPTION ENCOUNTER (OUTPATIENT)
Age: 73
End: 2023-05-30

## 2023-06-22 ENCOUNTER — APPOINTMENT (OUTPATIENT)
Dept: PHYSICAL MEDICINE AND REHAB | Facility: CLINIC | Age: 73
End: 2023-06-22